# Patient Record
Sex: MALE | Race: WHITE | Employment: OTHER | URBAN - METROPOLITAN AREA
[De-identification: names, ages, dates, MRNs, and addresses within clinical notes are randomized per-mention and may not be internally consistent; named-entity substitution may affect disease eponyms.]

---

## 2017-01-23 ENCOUNTER — LAB ENCOUNTER (OUTPATIENT)
Dept: LAB | Age: 77
End: 2017-01-23
Attending: INTERNAL MEDICINE
Payer: MEDICARE

## 2017-01-23 DIAGNOSIS — E88.81 DYSMETABOLIC SYNDROME X: ICD-10-CM

## 2017-01-23 DIAGNOSIS — E78.00 PURE HYPERCHOLESTEROLEMIA: Primary | ICD-10-CM

## 2017-01-23 DIAGNOSIS — I10 ESSENTIAL HYPERTENSION, MALIGNANT: ICD-10-CM

## 2017-01-23 LAB
ALBUMIN SERPL-MCNC: 3.7 G/DL (ref 3.5–4.8)
ALP LIVER SERPL-CCNC: 67 U/L (ref 45–117)
ALT SERPL-CCNC: 31 U/L (ref 17–63)
AST SERPL-CCNC: 19 U/L (ref 15–41)
BILIRUB SERPL-MCNC: 1.9 MG/DL (ref 0.1–2)
BUN BLD-MCNC: 16 MG/DL (ref 8–20)
CALCIUM BLD-MCNC: 9.6 MG/DL (ref 8.3–10.3)
CHLORIDE: 104 MMOL/L (ref 101–111)
CHOLEST SMN-MCNC: 164 MG/DL (ref ?–200)
CO2: 34 MMOL/L (ref 22–32)
CREAT BLD-MCNC: 0.88 MG/DL (ref 0.7–1.3)
GLUCOSE BLD-MCNC: 111 MG/DL (ref 70–99)
HDLC SERPL-MCNC: 56 MG/DL (ref 45–?)
HDLC SERPL: 2.93 {RATIO} (ref ?–4.97)
LDLC SERPL CALC-MCNC: 68 MG/DL (ref ?–130)
M PROTEIN MFR SERPL ELPH: 6.9 G/DL (ref 6.1–8.3)
NONHDLC SERPL-MCNC: 108 MG/DL (ref ?–130)
POTASSIUM SERPL-SCNC: 4.3 MMOL/L (ref 3.6–5.1)
SODIUM SERPL-SCNC: 140 MMOL/L (ref 136–144)
TRIGLYCERIDES: 198 MG/DL (ref ?–150)
VLDL: 40 MG/DL (ref 5–40)

## 2017-01-23 PROCEDURE — 36415 COLL VENOUS BLD VENIPUNCTURE: CPT

## 2017-01-23 PROCEDURE — 80061 LIPID PANEL: CPT

## 2017-01-23 PROCEDURE — 80053 COMPREHEN METABOLIC PANEL: CPT

## 2017-02-08 ENCOUNTER — TELEPHONE (OUTPATIENT)
Dept: FAMILY MEDICINE CLINIC | Facility: CLINIC | Age: 77
End: 2017-02-08

## 2017-02-10 ENCOUNTER — MED REC SCAN ONLY (OUTPATIENT)
Dept: FAMILY MEDICINE CLINIC | Facility: CLINIC | Age: 77
End: 2017-02-10

## 2017-02-10 ENCOUNTER — OFFICE VISIT (OUTPATIENT)
Dept: FAMILY MEDICINE CLINIC | Facility: CLINIC | Age: 77
End: 2017-02-10

## 2017-02-10 ENCOUNTER — TELEPHONE (OUTPATIENT)
Dept: FAMILY MEDICINE CLINIC | Facility: CLINIC | Age: 77
End: 2017-02-10

## 2017-02-10 VITALS
HEIGHT: 66 IN | DIASTOLIC BLOOD PRESSURE: 76 MMHG | SYSTOLIC BLOOD PRESSURE: 124 MMHG | WEIGHT: 242 LBS | HEART RATE: 80 BPM | TEMPERATURE: 99 F | BODY MASS INDEX: 38.89 KG/M2 | RESPIRATION RATE: 20 BRPM

## 2017-02-10 DIAGNOSIS — M54.32 SCIATICA OF LEFT SIDE: ICD-10-CM

## 2017-02-10 DIAGNOSIS — E04.2 GOITER, NONTOXIC, MULTINODULAR: ICD-10-CM

## 2017-02-10 DIAGNOSIS — E03.9 ACQUIRED HYPOTHYROIDISM: Primary | ICD-10-CM

## 2017-02-10 DIAGNOSIS — E66.01 SEVERE OBESITY (BMI 35.0-39.9): ICD-10-CM

## 2017-02-10 DIAGNOSIS — Z85.46 HISTORY OF PROSTATE CANCER: ICD-10-CM

## 2017-02-10 DIAGNOSIS — Z90.79 STATUS POST PROSTATECTOMY: ICD-10-CM

## 2017-02-10 DIAGNOSIS — M51.26 LUMBAR DISC HERNIATION: ICD-10-CM

## 2017-02-10 DIAGNOSIS — J30.0 VASOMOTOR RHINITIS: ICD-10-CM

## 2017-02-10 DIAGNOSIS — R73.9 BLOOD GLUCOSE ELEVATED: ICD-10-CM

## 2017-02-10 PROBLEM — H40.9 GLAUCOMA: Status: ACTIVE | Noted: 2017-02-10

## 2017-02-10 PROCEDURE — 99204 OFFICE O/P NEW MOD 45 MIN: CPT | Performed by: FAMILY MEDICINE

## 2017-02-10 RX ORDER — TRAMADOL HYDROCHLORIDE 50 MG/1
50 TABLET ORAL EVERY 6 HOURS PRN
Qty: 30 TABLET | Refills: 1 | Status: SHIPPED
Start: 2017-02-10 | End: 2017-02-24

## 2017-02-10 RX ORDER — AZELASTINE 1 MG/ML
2 SPRAY, METERED NASAL 2 TIMES DAILY
Qty: 1 BOTTLE | Refills: 3 | Status: SHIPPED | OUTPATIENT
Start: 2017-02-10 | End: 2018-08-07

## 2017-02-10 RX ORDER — ACETAMINOPHEN AND CODEINE PHOSPHATE 300; 30 MG/1; MG/1
1-2 TABLET ORAL EVERY 4 HOURS PRN
Qty: 90 TABLET | Refills: 0 | Status: SHIPPED
Start: 2017-02-10 | End: 2017-02-20

## 2017-02-10 NOTE — PATIENT INSTRUCTIONS
Dr. Woo Primrose    Pain specialist.    Phone: [phone] [address]  [place], [address]

## 2017-02-10 NOTE — PROGRESS NOTES
Patient presents with:  Establish Care        HPI:      Pt has L buttock pain, sharp, throbbing, radiation to the L foot, worsening, certain positions makes it worse and rest and lying still makes it better. Worsening. Last:years, knows disk disease.     Casilda Koyanagi Unspecified essential hypertension    • Cancer [facility])      Prostate   • SVT (supraventricular tachycardia) ([facility] [address] 2006     Aprox.  2006   • History of cardioversion    • GERD (gastroesophageal reflux disease)    • Hypothyroidism    • Sleep apnea      c pap cancer  Plan: will get MR.    (J30.0) Vasomotor rhinitis  Plan: Azelastine HCl (ASTELIN) 0.1 % Nasal Solution    Obesity severe, elevated BG: diet d/w the pt, 45 minutes education about weight loss, healthy life styles, thyroid nodules.           F/u in 3 m

## 2017-02-17 ENCOUNTER — HOSPITAL ENCOUNTER (OUTPATIENT)
Dept: MRI IMAGING | Age: 77
Discharge: HOME | End: 2017-02-17
Attending: FAMILY MEDICINE
Payer: MEDICARE

## 2017-02-17 DIAGNOSIS — M51.26 LUMBAR DISC HERNIATION: ICD-10-CM

## 2017-02-17 PROCEDURE — 72148 MRI LUMBAR SPINE W/O DYE: CPT

## 2017-02-21 ENCOUNTER — TELEPHONE (OUTPATIENT)
Dept: FAMILY MEDICINE CLINIC | Facility: CLINIC | Age: 77
End: 2017-02-21

## 2017-02-21 DIAGNOSIS — M99.53 INTERVERTEBRAL DISC STENOSIS OF NEURAL CANAL OF LUMBAR REGION: Primary | ICD-10-CM

## 2017-02-21 NOTE — TELEPHONE ENCOUNTER
----- Message from [place], [place] sent at 2/18/2017 12:06 PM CST -----  Patient has severe canal stenosis from L2-L5 from disc bulge. Pt needs to see Dr. Frandy Roman group for further eval and tx.

## 2017-02-21 NOTE — TELEPHONE ENCOUNTER
Ester Mcnamara:  [address] [facility], [address]   Phone: [phone]   Fax: [phone]    Patient has an appointment to see Dr. Isaac Moody in March. Will make appointment to see Dr. Mishel Thompson also.     Spoke to patient and sent information via my marcus

## 2017-03-03 ENCOUNTER — TELEPHONE (OUTPATIENT)
Dept: FAMILY MEDICINE CLINIC | Facility: CLINIC | Age: 77
End: 2017-03-03

## 2017-03-03 DIAGNOSIS — Z01.818 PRE-OP TESTING: Primary | ICD-10-CM

## 2017-03-03 NOTE — TELEPHONE ENCOUNTER
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 Quiana Montgomery and  Pre-op with Dr. Rudolph Barraza and need to have blood work/chext xray/mrsa culture/ u/a c/s  1 week prior to appt. The EKG day of pre-op.

## 2017-03-14 ENCOUNTER — TELEPHONE (OUTPATIENT)
Dept: FAMILY MEDICINE CLINIC | Facility: CLINIC | Age: 77
End: 2017-03-14

## 2017-08-12 ENCOUNTER — TELEPHONE (OUTPATIENT)
Dept: FAMILY MEDICINE CLINIC | Facility: CLINIC | Age: 77
End: 2017-08-12

## 2017-08-12 RX ORDER — ASPIRIN 81 MG/1
81 TABLET ORAL DAILY
Qty: 30 TABLET | Refills: 11 | Status: SHIPPED | OUTPATIENT
End: 2017-09-19 | Stop reason: ALTCHOICE

## 2017-09-19 ENCOUNTER — OFFICE VISIT (OUTPATIENT)
Dept: FAMILY MEDICINE CLINIC | Facility: CLINIC | Age: 77
End: 2017-09-19

## 2017-09-19 VITALS
SYSTOLIC BLOOD PRESSURE: 116 MMHG | DIASTOLIC BLOOD PRESSURE: 74 MMHG | OXYGEN SATURATION: 98 % | HEART RATE: 62 BPM | BODY MASS INDEX: 34 KG/M2 | TEMPERATURE: 98 F | WEIGHT: 224 LBS | RESPIRATION RATE: 16 BRPM

## 2017-09-19 DIAGNOSIS — J01.40 ACUTE PANSINUSITIS, RECURRENCE NOT SPECIFIED: Primary | ICD-10-CM

## 2017-09-19 PROCEDURE — 99213 OFFICE O/P EST LOW 20 MIN: CPT | Performed by: PHYSICIAN ASSISTANT

## 2017-09-19 RX ORDER — AMOXICILLIN AND CLAVULANATE POTASSIUM 875; 125 MG/1; MG/1
1 TABLET, FILM COATED ORAL 2 TIMES DAILY
Qty: 20 TABLET | Refills: 0 | Status: SHIPPED | OUTPATIENT
Start: 2017-09-19 | End: 2017-09-29

## 2017-09-19 NOTE — PATIENT INSTRUCTIONS
Self-Care for Sinusitis     Drinking plenty of water can help sinuses drain. Sinusitis can often be managed with self-care. Self-care can keep sinuses moist and make you feel more comfortable. Remember to follow your doctor's instructions closely.  This Colds, flu, and allergies make it more likely for you to get sinusitis. Do your best to prevent sinusitis by preventing these problems. Do what you can to avoid getting colds and other infections. Stay away from things that cause allergies (allergens).  Isabel Singleton · Stay away from all types of smoke, which dries out sinus linings. This includes tobacco smoke and chemical smoke in workplace settings. · Use saltwater rinses.   Date Last Reviewed: 10/1/2016  © 8625-5454 The [address] Treatment is aimed at unblocking the sinus opening and helping the cilia work again. You may need to take antihistamine and decongestant medicine. These can reduce inflammation and decrease the amount of fluid your sinuses make.  If you have a bacterial inf

## 2017-09-19 NOTE — PROGRESS NOTES
CHIEF COMPLAINT:   Patient presents with:  Nasal Congestion: almost 1 week. HPI:   Fito Garg is a 78year old male who presents for sinus congestion for  1  weeks. Symptoms have been worsening since onset.  Sinus congestion/pain is described as a pres • SVT (supraventricular tachycardia) ([facility] [address] 2006    Aprox.  2006   • Thyroid disease    • Unspecified essential hypertension       Past Surgical History:  2016: COLONOSCPY, FLEXIBLE, PROXIMAL TO SPLENIC FLEX*  02/2002: LAPAROSCOPY, SURGICAL PROSTATECTOMY, RE ASSESSMENT: Acute pansinusitis, recurrence not specified  (primary encounter diagnosis)    PLAN: Meds as below- take with meals, probiotic and/or greek yogurt as directed. Pt has taken before and tolerated well.   Comfort care instructions as listed in Ismael Resendiz Your doctor may prescribe medications to help treat your sinusitis. If you have an infection, antibiotics can help clear it up. If you are prescribed antibiotics, take all pills on schedule until they are gone, even if you feel better.  Decongestants help r · Sit in the nonsmoking sections of restaurants. · Don't go outdoors during peak pollution hours such as rush hour. · Keep an air conditioner on during allergy season. Clean its filter regularly.   · Ask your healthcare provider about a referral to have a Common symptoms of acute sinusitis  You may have:  · Facial soreness pain  · Headache  · Fever  · Fluid draining in the back of the throat (postnasal drip)  · Congestion  · Drainage that is thick and colored, instead of clear  · Cough  Diagnosing acute sin

## 2017-10-04 ENCOUNTER — HOSPITAL ENCOUNTER (OUTPATIENT)
Dept: CV DIAGNOSTICS | Facility: HOSPITAL | Age: 77
Discharge: HOME | End: 2017-10-04
Attending: INTERNAL MEDICINE
Payer: MEDICARE

## 2017-10-04 ENCOUNTER — HOSPITAL ENCOUNTER (OUTPATIENT)
Dept: CARDIOLOGY CLINIC | Facility: HOSPITAL | Age: 77
Discharge: HOME | End: 2017-10-04
Attending: INTERNAL MEDICINE

## 2017-10-04 DIAGNOSIS — R94.31 ABNORMAL EKG: ICD-10-CM

## 2017-10-04 DIAGNOSIS — I65.23 BILATERAL CAROTID ARTERY STENOSIS: ICD-10-CM

## 2017-10-04 PROCEDURE — 93017 CV STRESS TEST TRACING ONLY: CPT | Performed by: INTERNAL MEDICINE

## 2017-10-04 PROCEDURE — 78452 HT MUSCLE IMAGE SPECT MULT: CPT | Performed by: INTERNAL MEDICINE

## 2017-10-04 PROCEDURE — 93018 CV STRESS TEST I&R ONLY: CPT | Performed by: INTERNAL MEDICINE

## 2017-10-06 ENCOUNTER — TELEPHONE (OUTPATIENT)
Dept: FAMILY MEDICINE CLINIC | Facility: CLINIC | Age: 77
End: 2017-10-06

## 2017-12-05 ENCOUNTER — TELEPHONE (OUTPATIENT)
Dept: FAMILY MEDICINE CLINIC | Facility: CLINIC | Age: 77
End: 2017-12-05

## 2017-12-11 ENCOUNTER — OFFICE VISIT (OUTPATIENT)
Dept: FAMILY MEDICINE CLINIC | Facility: CLINIC | Age: 77
End: 2017-12-11

## 2017-12-11 ENCOUNTER — APPOINTMENT (OUTPATIENT)
Dept: LAB | Age: 77
End: 2017-12-11
Attending: FAMILY MEDICINE
Payer: MEDICARE

## 2017-12-11 ENCOUNTER — MED REC SCAN ONLY (OUTPATIENT)
Dept: FAMILY MEDICINE CLINIC | Facility: CLINIC | Age: 77
End: 2017-12-11

## 2017-12-11 VITALS
SYSTOLIC BLOOD PRESSURE: 128 MMHG | DIASTOLIC BLOOD PRESSURE: 72 MMHG | RESPIRATION RATE: 22 BRPM | WEIGHT: 221 LBS | HEART RATE: 78 BPM | HEIGHT: 68 IN | OXYGEN SATURATION: 99 % | BODY MASS INDEX: 33.49 KG/M2 | TEMPERATURE: 99 F

## 2017-12-11 DIAGNOSIS — E78.00 PURE HYPERCHOLESTEROLEMIA: ICD-10-CM

## 2017-12-11 DIAGNOSIS — E03.9 ACQUIRED HYPOTHYROIDISM: Primary | ICD-10-CM

## 2017-12-11 DIAGNOSIS — Z23 NEED FOR PNEUMOCOCCAL VACCINATION: ICD-10-CM

## 2017-12-11 DIAGNOSIS — I10 HTN (HYPERTENSION), BENIGN: ICD-10-CM

## 2017-12-11 DIAGNOSIS — Z85.46 HISTORY OF PROSTATE CANCER: ICD-10-CM

## 2017-12-11 PROBLEM — E66.01 SEVERE OBESITY (BMI 35.0-39.9): Status: RESOLVED | Noted: 2017-02-10 | Resolved: 2017-12-11

## 2017-12-11 PROCEDURE — 99214 OFFICE O/P EST MOD 30 MIN: CPT | Performed by: FAMILY MEDICINE

## 2017-12-11 PROCEDURE — 85025 COMPLETE CBC W/AUTO DIFF WBC: CPT | Performed by: FAMILY MEDICINE

## 2017-12-11 PROCEDURE — 90670 PCV13 VACCINE IM: CPT | Performed by: FAMILY MEDICINE

## 2017-12-11 PROCEDURE — 80061 LIPID PANEL: CPT | Performed by: FAMILY MEDICINE

## 2017-12-11 PROCEDURE — 84153 ASSAY OF PSA TOTAL: CPT | Performed by: FAMILY MEDICINE

## 2017-12-11 PROCEDURE — 84443 ASSAY THYROID STIM HORMONE: CPT | Performed by: FAMILY MEDICINE

## 2017-12-11 PROCEDURE — 36415 COLL VENOUS BLD VENIPUNCTURE: CPT | Performed by: FAMILY MEDICINE

## 2017-12-11 PROCEDURE — G0009 ADMIN PNEUMOCOCCAL VACCINE: HCPCS | Performed by: FAMILY MEDICINE

## 2017-12-11 PROCEDURE — 80053 COMPREHEN METABOLIC PANEL: CPT | Performed by: FAMILY MEDICINE

## 2017-12-11 RX ORDER — LEVOTHYROXINE SODIUM 0.05 MG/1
TABLET ORAL
Qty: 90 TABLET | Refills: 4 | Status: SHIPPED | OUTPATIENT
Start: 2017-12-11 | End: 2018-03-26

## 2017-12-11 NOTE — PROGRESS NOTES
CC: HL,HTN, hypothyroid, thyroid nodules. Jesus Medellin is a 78year old male who presents for recheck of hyperlipidemia. Patient reports taking medications as instructed, no medication side effects noted. Denies any generalized muscle aches.   Patient 02/21/2011 22   ----------  ALT (SGPT) (IU/L)   Date Value   12/21/2006 35   03/13/2006 26   ----------  ALT (U/L)   Date Value   09/11/2013 54   02/24/2012 33   02/21/2011 32   ----------  Alt (U/L)   Date Value   01/23/2017 31   05/26/2016 28   02/21/2 Packs/day: 0.00      Years: 0.00         Types: Cigars  Smokeless tobacco: Never Used                      Alcohol use: Yes           0.0 oz/week     Comment: social          REVIEW OF SYSTEMS:   GENERAL HEALTH: feels well otherwise  SKIN: denies any unusu

## 2017-12-11 NOTE — PATIENT INSTRUCTIONS
Dr. Reina Heath, DO    Neurology  [facility] [place]  [address] [facility]    Phone: [phone] Dr Herlinda Roger, [address] [place] [address]    Phone: [phone]

## 2018-02-05 ENCOUNTER — OFFICE VISIT (OUTPATIENT)
Dept: NEUROLOGY | Facility: CLINIC | Age: 78
End: 2018-02-05

## 2018-02-05 VITALS
SYSTOLIC BLOOD PRESSURE: 126 MMHG | WEIGHT: 222 LBS | BODY MASS INDEX: 33.65 KG/M2 | RESPIRATION RATE: 16 BRPM | HEART RATE: 62 BPM | DIASTOLIC BLOOD PRESSURE: 80 MMHG | HEIGHT: 68 IN

## 2018-02-05 DIAGNOSIS — R26.9 GAIT DISORDER: ICD-10-CM

## 2018-02-05 DIAGNOSIS — M48.061 SPINAL STENOSIS AT L4-L5 LEVEL: Primary | ICD-10-CM

## 2018-02-05 PROCEDURE — 99204 OFFICE O/P NEW MOD 45 MIN: CPT | Performed by: OTHER

## 2018-02-05 RX ORDER — TIMOLOL MALEATE 5 MG/ML
SOLUTION/ DROPS OPHTHALMIC
Refills: 3 | COMMUNITY
Start: 2018-01-12 | End: 2018-10-18

## 2018-02-05 RX ORDER — GABAPENTIN 100 MG/1
CAPSULE ORAL
Qty: 270 CAPSULE | Refills: 1 | Status: SHIPPED | OUTPATIENT
Start: 2018-02-05 | End: 2018-09-21 | Stop reason: ALTCHOICE

## 2018-02-05 NOTE — PATIENT INSTRUCTIONS
Refill policies:    • Allow 2-3 business days for refills; controlled substances may take longer.   • Contact your pharmacy at least 5 days prior to running out of medication and have them send an electronic request or submit request through the [facility] recommended that you have a procedure or additional testing performed. [facility]) will contact your insurance carrier to obtain pre-certification or prior authorization.     Unfortunately, [facility] has seen an increase in denial of paym

## 2018-02-05 NOTE — PROGRESS NOTES
[address]   Neurology- INITIAL CLINIC VISIT  2018, 11:42 AM     Louis Benson Patient Status:  No patient class for patient encounter    1940 MRN [id number]   Location [address] [place] [address] that he has quit smoking. His smoking use included Cigars. He has never used smokeless tobacco. He reports that he drinks alcohol. He reports that he does not use drugs.     Allergies:  No Known Allergies    MEDICATIONS:    Current Outpatient Prescriptions: distress  Cardiac: Normal rate & regular rhythm  Lungs: Clear to auscultation bilaterally  Skin: There are no rashes or other skin lesions.   Musculoskeletal: There is no scoliosis, or joint deformities  Neurologic examination:  Mental status: Patient is al to spinal stenosis, EMG to rule out neuropathy if does not improve with PT    1. Spinal stenosis at L4-L5 level  As above  - OP REFERRAL TO [facility]    2.  Gait disorder  As above  - OP REFERRAL TO [facility]

## 2018-02-20 ENCOUNTER — OFFICE VISIT (OUTPATIENT)
Dept: PHYSICAL THERAPY | Age: 78
End: 2018-02-20
Attending: Other
Payer: MEDICARE

## 2018-02-20 DIAGNOSIS — R26.9 GAIT DISORDER: ICD-10-CM

## 2018-02-20 DIAGNOSIS — M48.061 SPINAL STENOSIS AT L4-L5 LEVEL: ICD-10-CM

## 2018-02-20 PROCEDURE — 97110 THERAPEUTIC EXERCISES: CPT

## 2018-02-20 PROCEDURE — 97162 PT EVAL MOD COMPLEX 30 MIN: CPT

## 2018-02-20 NOTE — PROGRESS NOTES
NEUROLOGICAL EVALUATION:   Referring Physician: Dr. Aisha Jon  Diagnosis: Spinal stenosis at L4-L5 level,  Gait disorder    Date of Service: 2/20/2018     PATIENT SUMMARY   Katherin Go is a 78year old y/o male who presents to therapy today with complai difficulty with turning head when walking, difficulty with balance in the dark. Yuni Hackett describes prior level of function as independent in household and community level activity with gradually declining balance.  Pt goals include to improve LE strength, imp Precautions:  Fall Risk, history of prostate cancer  OBJECTIVE:   Observation/Posture: Patient sits and stands with min rounded shoulders and forward head, mod flexible to VC.  Patient is pleasant and moderately hard of hearing, very motivated to improv Issued and Handouts Given including modified tandem stance, DLS EC, STS, DLS feet togethet HT R/L and up/down.     Charges: PT Eval Moderate Complexity, Therex x 1      Total Timed Treatment: 15 min     Total Treatment Time: 50 min     PLAN OF CARE:    Goal [phone]    Sincerely,  Electronically signed by therapist: Kaden Rodriguez, PT, DPT    [de-identified] certification required: Yes  I certify the need for these services furnished under this plan of treatment and while under my care.     X____________________

## 2018-02-22 ENCOUNTER — OFFICE VISIT (OUTPATIENT)
Dept: PHYSICAL THERAPY | Age: 78
End: 2018-02-22
Attending: Other
Payer: MEDICARE

## 2018-02-22 PROCEDURE — 97110 THERAPEUTIC EXERCISES: CPT

## 2018-02-22 PROCEDURE — 97112 NEUROMUSCULAR REEDUCATION: CPT

## 2018-02-22 NOTE — PROGRESS NOTES
Dx: Spinal stenosis at L4-L5 level, Gait disorder            Authorized # of Visits:  10 requested [facility])         Next MD visit: none scheduled  Fall Risk: moderate-high         Precautions: Fall Risk, history of prostate cancer             Subjective: Leonor patient report pain/functional mobility.    Date: 2/22/2018 TX#: 2/10 Date:               TX#: 3/   Date:               TX#: 4/ Date:               TX#: 5/ Date:               TX#: 6/ Date:               TX#: 7/ Date:               TX#: 8/   Maddison L7 x 5 m

## 2018-02-27 ENCOUNTER — OFFICE VISIT (OUTPATIENT)
Dept: PHYSICAL THERAPY | Age: 78
End: 2018-02-27
Attending: Other
Payer: MEDICARE

## 2018-02-27 PROCEDURE — 97112 NEUROMUSCULAR REEDUCATION: CPT

## 2018-02-27 PROCEDURE — 97110 THERAPEUTIC EXERCISES: CPT

## 2018-02-27 NOTE — PROGRESS NOTES
Dx: Spinal stenosis at L4-L5 level, Gait disorder            Authorized # of Visits:  10 requested [facility])         Next MD visit: none scheduled  Fall Risk: moderate-high         Precautions: Fall Risk, history of prostate cancer             Subjective: Leonor and navigate uneven terrain for travelling. 5. Patient will be independent and compliant in HEP to maintain progress made in PT. Plan: Continue balance and strength training with progression as tolerated and indicated.  Assess lumbar further as needed

## 2018-03-01 ENCOUNTER — OFFICE VISIT (OUTPATIENT)
Dept: PHYSICAL THERAPY | Age: 78
End: 2018-03-01
Attending: Other
Payer: MEDICARE

## 2018-03-01 PROCEDURE — 97530 THERAPEUTIC ACTIVITIES: CPT

## 2018-03-01 PROCEDURE — 97110 THERAPEUTIC EXERCISES: CPT

## 2018-03-01 PROCEDURE — 97112 NEUROMUSCULAR REEDUCATION: CPT

## 2018-03-01 NOTE — PROGRESS NOTES
Dx: Spinal stenosis at L4-L5 level, Gait disorder            Authorized # of Visits:  10 requested [facility])         Next MD visit: none scheduled  Fall Risk: moderate-high         Precautions: Fall Risk, history of prostate cancer             Subjective: Leonor reciprocally and navigate uneven terrain for travelling. - progress  5.  Patient will be independent and compliant in HEP to maintain progress made in PT. - issued and progressed    Plan: Continue balance and strength training with progression as tolerated Walking cueing big steps, big arms x 5 min Cueing as needed with dynamic gait       Skilled Services: NR balance and compliant surface training, EC to improve somatosensory and proprioception LEs. LE therex progression within tolerance.  Progressed theract

## 2018-03-05 ENCOUNTER — OFFICE VISIT (OUTPATIENT)
Dept: NEUROLOGY | Facility: CLINIC | Age: 78
End: 2018-03-05

## 2018-03-05 VITALS
BODY MASS INDEX: 33.95 KG/M2 | DIASTOLIC BLOOD PRESSURE: 72 MMHG | SYSTOLIC BLOOD PRESSURE: 126 MMHG | HEART RATE: 66 BPM | HEIGHT: 68 IN | WEIGHT: 224 LBS | RESPIRATION RATE: 16 BRPM

## 2018-03-05 DIAGNOSIS — M48.061 SPINAL STENOSIS AT L4-L5 LEVEL: Primary | ICD-10-CM

## 2018-03-05 DIAGNOSIS — R26.9 GAIT DISORDER: ICD-10-CM

## 2018-03-05 PROCEDURE — 99213 OFFICE O/P EST LOW 20 MIN: CPT | Performed by: OTHER

## 2018-03-05 RX ORDER — GABAPENTIN 300 MG/1
300 CAPSULE ORAL 3 TIMES DAILY
Qty: 90 CAPSULE | Refills: 5 | Status: SHIPPED | OUTPATIENT
Start: 2018-03-05 | End: 2018-09-21 | Stop reason: ALTCHOICE

## 2018-03-05 RX ORDER — GABAPENTIN 100 MG/1
300 CAPSULE ORAL 3 TIMES DAILY
Qty: 270 CAPSULE | Refills: 1 | Status: CANCELLED | OUTPATIENT
Start: 2018-03-05

## 2018-03-05 RX ORDER — BRIMONIDINE TARTRATE 2 MG/ML
SOLUTION/ DROPS OPHTHALMIC
Refills: 3 | COMMUNITY
Start: 2018-02-23 | End: 2019-12-26 | Stop reason: ALTCHOICE

## 2018-03-05 NOTE — PATIENT INSTRUCTIONS
Refill policies:    • Allow 2-3 business days for refills; controlled substances may take longer.   • Contact your pharmacy at least 5 days prior to running out of medication and have them send an electronic request or submit request through the [facility] recommended that you have a procedure or additional testing performed. [facility]) will contact your insurance carrier to obtain pre-certification or prior authorization.     Unfortunately, [facility] has seen an increase in denial of paym

## 2018-03-05 NOTE — PROGRESS NOTES
[address] [address]   Neurology- follow up    Louis Benson Patient Status:  No patient class for patient encounter    1940 MRN [id number]   Location [facility] Fiordaliza Real MD       Reas History:  family history is not on file. Social History:   reports that he has quit smoking. His smoking use included Cigars. He has never used smokeless tobacco. He reports that he drinks alcohol. He reports that he does not use drugs.     Allergies:  [age] year old male in no acute distress  Cardiac: Normal rate & regular rhythm  Lungs: Clear to auscultation bilaterally  Skin: There are no rashes or other skin lesions.   Musculoskeletal: There is no scoliosis, or joint deformities  Neurologic examination:  Me All questions and concerns were addressed.      Nuha Mayberry DO  Neurology and Neuromuscular medicine  [facility]

## 2018-03-06 ENCOUNTER — OFFICE VISIT (OUTPATIENT)
Dept: PHYSICAL THERAPY | Age: 78
End: 2018-03-06
Attending: Other
Payer: MEDICARE

## 2018-03-06 PROCEDURE — 97530 THERAPEUTIC ACTIVITIES: CPT

## 2018-03-06 PROCEDURE — 97110 THERAPEUTIC EXERCISES: CPT

## 2018-03-06 PROCEDURE — 97112 NEUROMUSCULAR REEDUCATION: CPT

## 2018-03-06 NOTE — PROGRESS NOTES
Dx: Spinal stenosis at L4-L5 level, Gait disorder            Authorized # of Visits:  10 requested [facility])         Next MD visit: none scheduled  Fall Risk: moderate-high         Precautions: Fall Risk, history of prostate cancer             Subjective: Leonor ADL such as community ambulation. - progress  3. Patient will perform 4-Item DGI with score of 10/12 or greater with least restrictive AD to demonstrate ability to ambulate safely in crowded and busy environments. - progress  4.  Patient will improve functi 2  -HT combo x 2  -Object  x 2  (cueing big steps as needed)      EC on airex  - DLS, 3 x 21V  - DLS 2# OH press x 10  - DLS 2# twists x 10 EC  - fwd walking in // bars x 2 laps  - fwd walking out of // bars, check for veering, 25ft x 8 EC  - feet t

## 2018-03-08 ENCOUNTER — OFFICE VISIT (OUTPATIENT)
Dept: PHYSICAL THERAPY | Age: 78
End: 2018-03-08
Attending: Other
Payer: MEDICARE

## 2018-03-08 PROCEDURE — 97110 THERAPEUTIC EXERCISES: CPT

## 2018-03-08 PROCEDURE — 97112 NEUROMUSCULAR REEDUCATION: CPT

## 2018-03-08 RX ORDER — OMEPRAZOLE 40 MG/1
CAPSULE, DELAYED RELEASE ORAL
Qty: 180 CAPSULE | Refills: 3 | Status: SHIPPED | OUTPATIENT
Start: 2018-03-08 | End: 2020-02-17

## 2018-03-08 NOTE — PROGRESS NOTES
Dx: Spinal stenosis at L4-L5 level, Gait disorder            Authorized # of Visits:  10 requested [facility])         Next MD visit: none scheduled  Fall Risk: moderate-high         Precautions: Fall Risk, history of prostate cancer             Subjective: Cont ascend/descend stairs reciprocally and navigate uneven terrain for travelling. - progress  5.  Patient will be independent and compliant in HEP to maintain progress made in PT. - issued and progressed    Plan: Patient to hold x 2 weeks for vacation, will re fwd walking in // bars x 2 laps  - fwd walking out of // bars, check for veering, 25ft x 8 EC  - feet together multi-direct perturbations 2 x 30s EC   As listed below with airex -     Tandem walking in // bars x 4 laps, VC for foot placement  Full tandem s

## 2018-03-08 NOTE — TELEPHONE ENCOUNTER
LOV:12/11/17  Rx has not been filled by provider.      Please approve or deny pending Rx request.   Thank you

## 2018-03-08 NOTE — TELEPHONE ENCOUNTER
Pt called to request refill of Omeprazole 40mg, please send to [address], [address] [place] [address], [phone], [phone]. If any questions, call on Mobile number.

## 2018-03-26 DIAGNOSIS — E03.9 ACQUIRED HYPOTHYROIDISM: ICD-10-CM

## 2018-03-26 RX ORDER — LEVOTHYROXINE SODIUM 0.05 MG/1
TABLET ORAL
Qty: 90 TABLET | Refills: 4 | Status: SHIPPED | OUTPATIENT
Start: 2018-03-26 | End: 2019-06-24

## 2018-03-26 NOTE — TELEPHONE ENCOUNTER
Demetrius with [employer] called requesting refill for pt on Levothyroxine Sodium 50 MCG Oral Tab

## 2018-03-27 ENCOUNTER — OFFICE VISIT (OUTPATIENT)
Dept: PHYSICAL THERAPY | Age: 78
End: 2018-03-27
Attending: Other
Payer: MEDICARE

## 2018-03-27 PROCEDURE — 97110 THERAPEUTIC EXERCISES: CPT

## 2018-03-27 PROCEDURE — 97530 THERAPEUTIC ACTIVITIES: CPT

## 2018-03-27 PROCEDURE — 97112 NEUROMUSCULAR REEDUCATION: CPT

## 2018-03-27 NOTE — PROGRESS NOTES
Dx: Spinal stenosis at L4-L5 level, Gait disorder            Authorized # of Visits:  10 requested [facility])         Next MD visit: none scheduled  Fall Risk: moderate-high         Precautions: Fall Risk, history of prostate cancer             Subjective: Leonor and decrease risk of falls on uneven surfaces such as grass. - progress  2. Patient will perform TUG in <10 seconds with least restrictive AD, demonstrating improved gait speed for improved participation in ADL such as community ambulation. - progress  3. 3 attempts R/L Shuttle  - DLS, 4B x 20  - SLS, 3B 2 x 10 R/L    Multi-direct catch on airex x 3 min Bosu DLS SBA-CGA 3 x 13C Bosu DLS SBA-CGA 3 x 36J - 6MWT 6\" step ups with cues for glute set x 15 R/L UEs as needed    YTB  - side step x 3 laps  - fwd lianet 2 laps - GTB side step x 3 laps // bars      Skilled Services: NR balance and compliant surface training, small ELIE training. LE therex progression within tolerance. Progressed theract dynamic gait with increased difficulty of dual task.  Theract stair gita

## 2018-03-29 ENCOUNTER — OFFICE VISIT (OUTPATIENT)
Dept: PHYSICAL THERAPY | Age: 78
End: 2018-03-29
Attending: Other
Payer: MEDICARE

## 2018-03-29 PROCEDURE — 97110 THERAPEUTIC EXERCISES: CPT

## 2018-03-29 PROCEDURE — 97530 THERAPEUTIC ACTIVITIES: CPT

## 2018-03-29 NOTE — PROGRESS NOTES
Discharge Summary  Dx:  Spinal stenosis at L4-L5 level, Gait disorder            Authorized # of Visits:  10 requested [facility])         Next MD visit: none scheduled  Fall Risk: moderate-high         Precautions: Fall Risk, history of prostate cancer Strength WNL                  A/PROM Strength (-/5)     R L R L   Hip             Flexion [facility] WFL 5 4+     Extension [facility] WFL 4+ 4+     Abduction [facility] WFL 4 4     External Rot WFL WFL       Knee             Flexion [facility] WFL 5 5     Extension [facility] WFL 5 5   Jessi FOTO: 69/100     Plan: D/C from PT with continued independent HEP due to goals generally met and patient ready/willing to continue with HEP as listed.         Patient/Family/Caregiver was advised of these findings, precautions, and treatment options and h 6MWT 6\" step ups with cues for glute set x 15 R/L UEs as needed 6\" step ups with cues for glute set x 15 R/L UEs as needed   YTB  - side step x 3 laps  - fwd step x 3 laps - Dynamic Gait, 100ft   - fwd catch x 4  - HT R/L x 4  - HT up/down x 2  - HT comb now x 10 GTB, 25ft  - side step with mini squat x 1 lap  - fwd x 2 laps  - retro x 2 laps - GTB side step x 3 laps // bars   - GTB side step x 3 laps // bars   Skilled Services: Assessed goals as needed, provided education on POC, HEP progression, and D/C

## 2018-05-17 ENCOUNTER — TELEPHONE (OUTPATIENT)
Dept: FAMILY MEDICINE CLINIC | Facility: CLINIC | Age: 78
End: 2018-05-17

## 2018-08-07 ENCOUNTER — TELEPHONE (OUTPATIENT)
Dept: FAMILY MEDICINE CLINIC | Facility: CLINIC | Age: 78
End: 2018-08-07

## 2018-08-07 NOTE — TELEPHONE ENCOUNTER
Patient needs a referral to a glaucoma specialist.  Please advise patient's wife Kanika Hernandez with this information.

## 2018-08-07 NOTE — TELEPHONE ENCOUNTER
Wife notified of below orders. Dr. Alaina Maldonado office info given. All questions answered, wife expresses understanding.

## 2018-08-07 NOTE — TELEPHONE ENCOUNTER
I am happy to give him whoever his eye doctor recommend, we can ask Dr. Vinita Lester who he recommends if pt has no specialist.  Also no steroids nasal sprays OTC, please update the chart.

## 2018-08-07 NOTE — TELEPHONE ENCOUNTER
Pt sees Dr. Ganesh Renee at [facility] for Primary open angle glaucoma of both eyes, severe stage.   Pt is requesting a referral to another Glaucoma Specialist.

## 2018-08-30 ENCOUNTER — TELEPHONE (OUTPATIENT)
Dept: FAMILY MEDICINE CLINIC | Facility: CLINIC | Age: 78
End: 2018-08-30

## 2018-08-30 NOTE — TELEPHONE ENCOUNTER
CVM was left on Hippa line Re: Future Eye surgery 9/27/18 with Dr. Maria Guadalupe Shane needs to make an appointment for Pre op with EKG.

## 2018-09-04 NOTE — TELEPHONE ENCOUNTER
Patients spouse called back and schedule pre-op appointment in 9/21/18. Patient having eye surgery on 9/27/18 with Dr Tato De La Cruz in [facility]. [place] sheet was filled out and put in doctors philomenia folder.

## 2018-09-21 ENCOUNTER — OFFICE VISIT (OUTPATIENT)
Dept: FAMILY MEDICINE CLINIC | Facility: CLINIC | Age: 78
End: 2018-09-21
Payer: MEDICARE

## 2018-09-21 ENCOUNTER — TELEPHONE (OUTPATIENT)
Dept: FAMILY MEDICINE CLINIC | Facility: CLINIC | Age: 78
End: 2018-09-21

## 2018-09-21 VITALS
TEMPERATURE: 99 F | BODY MASS INDEX: 35.16 KG/M2 | DIASTOLIC BLOOD PRESSURE: 86 MMHG | OXYGEN SATURATION: 96 % | HEIGHT: 68 IN | HEART RATE: 62 BPM | SYSTOLIC BLOOD PRESSURE: 138 MMHG | RESPIRATION RATE: 16 BRPM | WEIGHT: 232 LBS

## 2018-09-21 DIAGNOSIS — H40.1122 PRIMARY OPEN ANGLE GLAUCOMA (POAG) OF LEFT EYE, MODERATE STAGE: ICD-10-CM

## 2018-09-21 DIAGNOSIS — I10 HTN (HYPERTENSION), BENIGN: ICD-10-CM

## 2018-09-21 DIAGNOSIS — Z01.818 PREOPERATIVE EXAMINATION: Primary | ICD-10-CM

## 2018-09-21 PROCEDURE — 93000 ELECTROCARDIOGRAM COMPLETE: CPT | Performed by: FAMILY MEDICINE

## 2018-09-21 PROCEDURE — 99214 OFFICE O/P EST MOD 30 MIN: CPT | Performed by: FAMILY MEDICINE

## 2018-09-21 NOTE — PROGRESS NOTES
CC: Preop exam.      HPI:  Louis Mirza is a 70year old male who presents for a pre-operative physical exam  By  request. Patient is to have glaucoma surgery ,secondary to progression of the cataract and difficulty with the vision  to be on 9 apnea      Comment:  c pap   2006: SVT (supraventricular tachycardia) (HCC)      Comment:  Aprox.  2006  No date: Thyroid disease  No date: Unspecified essential hypertension   Past Surgical History:  2016: COLONOSCPY, FLEXIBLE, PROXIMAL TO SPLENIC FLEXURE; edema  NEURO: Oriented times three,cranial nerves are intact,motor and sensory are grossly intact    ASSESSMENT AND PLAN:   Aaron Honeycutt is a 70year old male who presents for a pre-operative physical exam for glaucoma of the L eye. HTN: under good control.

## 2018-09-21 NOTE — TELEPHONE ENCOUNTER
H&P and EKG [facility] Faxed to [phone]. Fax confirmation received.

## 2018-10-18 ENCOUNTER — APPOINTMENT (OUTPATIENT)
Dept: LAB | Age: 78
End: 2018-10-18
Attending: FAMILY MEDICINE
Payer: MEDICARE

## 2018-10-18 ENCOUNTER — OFFICE VISIT (OUTPATIENT)
Dept: FAMILY MEDICINE CLINIC | Facility: CLINIC | Age: 78
End: 2018-10-18
Payer: MEDICARE

## 2018-10-18 VITALS
BODY MASS INDEX: 34.86 KG/M2 | HEART RATE: 56 BPM | RESPIRATION RATE: 18 BRPM | SYSTOLIC BLOOD PRESSURE: 146 MMHG | DIASTOLIC BLOOD PRESSURE: 86 MMHG | TEMPERATURE: 98 F | WEIGHT: 230 LBS | OXYGEN SATURATION: 97 % | HEIGHT: 68 IN

## 2018-10-18 DIAGNOSIS — Z85.46 HISTORY OF PROSTATE CANCER: ICD-10-CM

## 2018-10-18 DIAGNOSIS — I10 HTN (HYPERTENSION), BENIGN: ICD-10-CM

## 2018-10-18 DIAGNOSIS — E04.2 GOITER, NONTOXIC, MULTINODULAR: ICD-10-CM

## 2018-10-18 DIAGNOSIS — Z00.00 MEDICARE ANNUAL WELLNESS VISIT, SUBSEQUENT: Primary | ICD-10-CM

## 2018-10-18 DIAGNOSIS — R35.0 INCREASED URINARY FREQUENCY: ICD-10-CM

## 2018-10-18 PROBLEM — M54.32 SCIATICA OF LEFT SIDE: Status: RESOLVED | Noted: 2017-02-10 | Resolved: 2018-10-18

## 2018-10-18 PROBLEM — R73.9 BLOOD GLUCOSE ELEVATED: Status: RESOLVED | Noted: 2017-02-10 | Resolved: 2018-10-18

## 2018-10-18 PROCEDURE — G0444 DEPRESSION SCREEN ANNUAL: HCPCS | Performed by: FAMILY MEDICINE

## 2018-10-18 PROCEDURE — G0439 PPPS, SUBSEQ VISIT: HCPCS | Performed by: FAMILY MEDICINE

## 2018-10-18 NOTE — PATIENT INSTRUCTIONS
Prevention Guidelines, Men Ages 72 and Older  Screening tests and vaccines are an important part of managing your health. A screening test is done to find possible disorders or diseases in people who don't have any symptoms.  The goal is to find a disease Hepatitis C Men at increased risk for infection – talk with your healthcare provider At routine exams   High cholesterol or triglycerides All men in this age group At least every 5 years   HIV Men at increased risk for infection – talk with your healthcare Pneumococcal conjugate vaccine (PCV13) and pneumococcal polysaccharide vaccine (PPSV23) All men in this age group 1 dose of each vaccine   Tetanus/diphtheria/  pertussis (Td/Tdap) booster All men in this age group Td every 10 years, or Tdap if you will hav

## 2018-10-18 NOTE — PROGRESS NOTES
REASON FOR VISIT:    Salvatore Bonilla is a 70year old male who presents for a Medicare Annual Wellness visit.     HPI:     Patient Care Team: Patient Care Team:  Rashid Davidson MD as PCP - General (Family Practice)  Lori Samaniego MD (OTOLARYNGOLOGY)  Be Total Cholesterol <200 mg/dL 155 154 164 162 159 162 173   Triglycerides 30 - 149 mg/dL 110 104 198(H) 160(H) 139 151(H) 194(H)   HDL 40 - 59 mg/dL 51 56 56 49 50 53 53   LDL <100 mg/dL 82 77 68 81 81 79 81     BUN and Cr Latest Ref Rng & Units 10/18/201 Yes     Functional Status     Hearing Problems?: Yes  Vision Problems? : Yes  Difficulty walking?: Yes  Difficulty dressing or bathing?: No  Problems with daily activities? : No  Memory Problems?: No      Fall/Risk Assessment     Have you fallen in the las for this or any previous visit.      SPECIFIC DISEASE MONITORING Internal Lab or Procedure     Annual Monitoring of Persistent Medications  (ACE/ARB, Digoxin, Diuretics)        Potassium  Annually Potassium (mmol/L)   Date Value   10/18/2018 3.9   03/13/200 feels well otherwise  SKIN: denies any unusual skin lesions  EYES: denies blurred vision or double vision  HEENT: denies nasal congestion, sinus pain or ST  LUNGS: denies shortness of breath with exertion  CARDIOVASCULAR: denies chest pain on exertion  GI: HORMONE  -     CBC WITH DIFFERENTIAL WITH PLATELET  -     CBC W/ DIFFERENTIAL    H/o prostate ca:  -     CBC WITH DIFFERENTIAL WITH PLATELET  -     PSA  -     CBC W/ DIFFERENTIAL      Galucoma: stable.     The patient indicates understanding of these issues

## 2019-02-20 ENCOUNTER — MED REC SCAN ONLY (OUTPATIENT)
Dept: FAMILY MEDICINE CLINIC | Facility: CLINIC | Age: 79
End: 2019-02-20

## 2019-03-03 ENCOUNTER — PATIENT MESSAGE (OUTPATIENT)
Dept: FAMILY MEDICINE CLINIC | Facility: CLINIC | Age: 79
End: 2019-03-03

## 2019-03-04 RX ORDER — COLCHICINE 0.6 MG/1
TABLET ORAL
Qty: 3 TABLET | Refills: 0 | Status: SHIPPED | OUTPATIENT
Start: 2019-03-04 | End: 2019-12-26 | Stop reason: ALTCHOICE

## 2019-03-04 NOTE — TELEPHONE ENCOUNTER
From: Naomy Vines  To: Sixto Manuel MD  Sent: 3/3/2019 11:33 AM CST  Subject: Prescription Question    Hi Dr Danny Benson I have had flare ups of GOUT in my big toe for several years in the past i have taken a low dose of COLCHICINE and it has helped in

## 2019-03-04 NOTE — TELEPHONE ENCOUNTER
Patient having a gout flare up and is requesting colchicine. Patient says he has been treated with this before. Does not appear we have ever prescribed. Please advise, thanks. LOV 10/18/18.

## 2019-06-24 ENCOUNTER — TELEPHONE (OUTPATIENT)
Dept: FAMILY MEDICINE CLINIC | Facility: CLINIC | Age: 79
End: 2019-06-24

## 2019-06-24 DIAGNOSIS — E03.9 ACQUIRED HYPOTHYROIDISM: ICD-10-CM

## 2019-06-24 RX ORDER — LEVOTHYROXINE SODIUM 0.05 MG/1
50 TABLET ORAL
Qty: 90 TABLET | Refills: 1 | Status: SHIPPED | OUTPATIENT
Start: 2019-06-24 | End: 2019-12-06

## 2019-06-24 NOTE — TELEPHONE ENCOUNTER
Last office visit:    (if over 1 year, schedule appointment) 10/18/18    Appointment scheduled with:     on .     Requested medication:  Levothyroxine Sodium 50 MCG Oral Tab for 90 days     Pharmacy: [facility] [address], [address]

## 2019-09-04 ENCOUNTER — OFFICE VISIT (OUTPATIENT)
Dept: FAMILY MEDICINE CLINIC | Facility: CLINIC | Age: 79
End: 2019-09-04
Payer: MEDICARE

## 2019-09-04 VITALS
OXYGEN SATURATION: 96 % | BODY MASS INDEX: 35.79 KG/M2 | SYSTOLIC BLOOD PRESSURE: 134 MMHG | DIASTOLIC BLOOD PRESSURE: 72 MMHG | WEIGHT: 236.19 LBS | TEMPERATURE: 99 F | HEART RATE: 87 BPM | RESPIRATION RATE: 20 BRPM | HEIGHT: 68 IN

## 2019-09-04 DIAGNOSIS — J20.9 ACUTE BRONCHITIS, UNSPECIFIED ORGANISM: Primary | ICD-10-CM

## 2019-09-04 DIAGNOSIS — J01.00 ACUTE NON-RECURRENT MAXILLARY SINUSITIS: ICD-10-CM

## 2019-09-04 PROCEDURE — 99214 OFFICE O/P EST MOD 30 MIN: CPT | Performed by: FAMILY MEDICINE

## 2019-09-04 RX ORDER — AZITHROMYCIN 250 MG/1
TABLET, FILM COATED ORAL
Qty: 6 TABLET | Refills: 0 | Status: SHIPPED | OUTPATIENT
Start: 2019-09-04 | End: 2019-12-26 | Stop reason: ALTCHOICE

## 2019-09-04 RX ORDER — CODEINE PHOSPHATE AND GUAIFENESIN 10; 100 MG/5ML; MG/5ML
5 SOLUTION ORAL 3 TIMES DAILY PRN
Qty: 120 ML | Refills: 0 | Status: SHIPPED | OUTPATIENT
Start: 2019-09-04 | End: 2019-12-26 | Stop reason: ALTCHOICE

## 2019-09-04 RX ORDER — PREDNISONE 20 MG/1
TABLET ORAL
Qty: 10 TABLET | Refills: 0 | Status: SHIPPED | OUTPATIENT
Start: 2019-09-04 | End: 2019-12-26 | Stop reason: ALTCHOICE

## 2019-09-04 NOTE — PROGRESS NOTES
Patient presents with:  Sinus Problem: pressure, PND, cough, chills - x4 days      HPI:   Aaron Honeycutt is a 70year old male who presents for cough  for  5  days. Patient reports congestion, dry cough, sinus pain, wheezing. Cough started gradually, tight, w Date   • Cancer [facility])     Prostate   • GERD (gastroesophageal reflux disease)    • History of cardioversion    • Hypothyroidism    • Sleep apnea     c pap    • SVT (supraventricular tachycardia) ([facility] [address] 2006    Aprox.  2006   • Thyroid disease    • Unspecifie rales, no crackles. Normal on percussion. No decreased BS. Normal on palpation,normal vocal fremitus. CARDIO: RRR without murmur  GI: good BS's,no masses, HSM or tenderness    ASSESSMENT AND PLAN:     Anju Smith was seen today for sinus problem.     Diagnoses and a

## 2019-10-21 ENCOUNTER — TELEPHONE (OUTPATIENT)
Dept: FAMILY MEDICINE CLINIC | Facility: CLINIC | Age: 79
End: 2019-10-21

## 2019-12-06 DIAGNOSIS — E03.9 ACQUIRED HYPOTHYROIDISM: ICD-10-CM

## 2019-12-07 NOTE — TELEPHONE ENCOUNTER
Medication(s) to Refill:   Requested Prescriptions     Pending Prescriptions Disp Refills   • LEVOTHYROXINE SODIUM 50 MCG Oral Tab [Pharmacy Med Name: LEVOTHYROXINE 0.05MG (50MCG) TAB] 90 tablet 0     Sig: TAKE 1 TABLET(50 MCG) BY MOUTH DAILY BEFORE BREAKF

## 2019-12-09 RX ORDER — LEVOTHYROXINE SODIUM 0.05 MG/1
TABLET ORAL
Qty: 90 TABLET | Refills: 0 | Status: SHIPPED | OUTPATIENT
Start: 2019-12-09 | End: 2020-03-13

## 2019-12-26 ENCOUNTER — TELEPHONE (OUTPATIENT)
Dept: FAMILY MEDICINE CLINIC | Facility: CLINIC | Age: 79
End: 2019-12-26

## 2019-12-26 ENCOUNTER — APPOINTMENT (OUTPATIENT)
Dept: LAB | Age: 79
End: 2019-12-26
Attending: FAMILY MEDICINE
Payer: MEDICARE

## 2019-12-26 ENCOUNTER — OFFICE VISIT (OUTPATIENT)
Dept: FAMILY MEDICINE CLINIC | Facility: CLINIC | Age: 79
End: 2019-12-26
Payer: MEDICARE

## 2019-12-26 VITALS
DIASTOLIC BLOOD PRESSURE: 86 MMHG | OXYGEN SATURATION: 95 % | BODY MASS INDEX: 35.46 KG/M2 | RESPIRATION RATE: 14 BRPM | HEIGHT: 68 IN | SYSTOLIC BLOOD PRESSURE: 126 MMHG | HEART RATE: 84 BPM | WEIGHT: 234 LBS | TEMPERATURE: 98 F

## 2019-12-26 DIAGNOSIS — E66.01 SEVERE OBESITY (HCC): ICD-10-CM

## 2019-12-26 DIAGNOSIS — K43.9 VENTRAL HERNIA WITHOUT OBSTRUCTION OR GANGRENE: ICD-10-CM

## 2019-12-26 DIAGNOSIS — Z12.5 SCREENING FOR PROSTATE CANCER: ICD-10-CM

## 2019-12-26 DIAGNOSIS — I10 HTN (HYPERTENSION), BENIGN: ICD-10-CM

## 2019-12-26 DIAGNOSIS — E03.9 ACQUIRED HYPOTHYROIDISM: ICD-10-CM

## 2019-12-26 DIAGNOSIS — Z23 NEED FOR PNEUMOCOCCAL VACCINATION: ICD-10-CM

## 2019-12-26 DIAGNOSIS — Z00.00 MEDICARE ANNUAL WELLNESS VISIT, SUBSEQUENT: Primary | ICD-10-CM

## 2019-12-26 PROCEDURE — 84443 ASSAY THYROID STIM HORMONE: CPT

## 2019-12-26 PROCEDURE — 90732 PPSV23 VACC 2 YRS+ SUBQ/IM: CPT | Performed by: FAMILY MEDICINE

## 2019-12-26 PROCEDURE — G0444 DEPRESSION SCREEN ANNUAL: HCPCS | Performed by: FAMILY MEDICINE

## 2019-12-26 PROCEDURE — G0009 ADMIN PNEUMOCOCCAL VACCINE: HCPCS | Performed by: FAMILY MEDICINE

## 2019-12-26 PROCEDURE — G0439 PPPS, SUBSEQ VISIT: HCPCS | Performed by: FAMILY MEDICINE

## 2019-12-26 PROCEDURE — 84439 ASSAY OF FREE THYROXINE: CPT

## 2019-12-26 RX ORDER — METOPROLOL SUCCINATE 50 MG/1
TABLET, EXTENDED RELEASE ORAL
COMMUNITY
Start: 2019-12-06

## 2019-12-26 RX ORDER — PREDNISOLONE ACETATE 10 MG/ML
SUSPENSION/ DROPS OPHTHALMIC
Refills: 0 | COMMUNITY
Start: 2019-09-17 | End: 2019-12-26 | Stop reason: ALTCHOICE

## 2019-12-26 RX ORDER — AMLODIPINE BESYLATE AND ATORVASTATIN CALCIUM 5; 10 MG/1; MG/1
5 TABLET, FILM COATED ORAL
COMMUNITY
End: 2020-01-21 | Stop reason: ALTCHOICE

## 2019-12-26 RX ORDER — DIFLUPREDNATE 0.5 MG/ML
1 EMULSION OPHTHALMIC
COMMUNITY
Start: 2018-04-10 | End: 2019-12-26 | Stop reason: ALTCHOICE

## 2019-12-26 RX ORDER — GABAPENTIN 300 MG/1
300 CAPSULE ORAL
COMMUNITY
End: 2019-12-26 | Stop reason: ALTCHOICE

## 2019-12-26 RX ORDER — PRAVASTATIN SODIUM 80 MG/1
80 TABLET ORAL
COMMUNITY
End: 2020-01-21 | Stop reason: ALTCHOICE

## 2019-12-26 RX ORDER — DORZOLAMIDE HYDROCHLORIDE AND TIMOLOL MALEATE 20; 5 MG/ML; MG/ML
1 SOLUTION/ DROPS OPHTHALMIC
COMMUNITY
Start: 2018-06-27 | End: 2019-12-26 | Stop reason: ALTCHOICE

## 2019-12-26 RX ORDER — OFLOXACIN 3 MG/ML
SOLUTION/ DROPS OPHTHALMIC
Refills: 0 | COMMUNITY
Start: 2019-09-17 | End: 2019-12-26 | Stop reason: ALTCHOICE

## 2019-12-26 RX ORDER — SPIRONOLACTONE AND HYDROCHLOROTHIAZIDE 25; 25 MG/1; MG/1
1 TABLET ORAL
COMMUNITY

## 2019-12-26 RX ORDER — ATROPINE SULFATE 10 MG/ML
1 SOLUTION/ DROPS OPHTHALMIC
COMMUNITY
Start: 2018-04-10 | End: 2020-01-21 | Stop reason: ALTCHOICE

## 2019-12-26 NOTE — PROGRESS NOTES
Normal results except borderline lipids. Low lipids diet recommended. Requested Prescriptions     Pending Prescriptions Disp Refills    potassium chloride (K-DUR, KLOR-CON) 20 mEq tablet 60 Tab 6     Sig: Take 1 Tab by mouth two (2) times a day.

## 2019-12-26 NOTE — PATIENT INSTRUCTIONS
Controlling High Blood Pressure  High blood pressure (hypertension) is often called the silent killer. This is because many people who have it, don’t know it.  High blood pressure can raise your risk of heart attack, stroke, heart disease, and heart failu · Ask your healthcare provider what weight range is healthiest for you. If you are overweight, a weight loss of only 3% to 5% of your body weight can help lower blood pressure.  Generally, a good weight loss goal is to lose 10% of your body weight in a year Eating salt (sodium) can make your body retain too much water. Excess water makes your heart work harder. Canned, packaged, and frozen foods are easy to prepare. But they are often high in sodium.  Here are some ideas for low-salt foods you can easily make Changing the way you eat can improve your health. It can lower your cholesterol and blood pressure, and help you stay at a healthy weight. Your diet doesn’t have to be bland and boring to be healthy.  Just watch your calories and follow these steps:    Step · Drink more water to match your fiber increase to help prevent constipation. Date Last Reviewed: 6/1/2017  © 4168-7530 [employer]. [facility] [facility], [address] [place] [address]. All rights reserved.  This information is not intended as a subs · Find sources of protein such as canned beans, tuna canned in water, eggs, low-fat or nonfat milk, and low-fat or nonfat cheese and yogurt. · Stay away from the snack foods! Don't be drawn in by all the chips, candy, soda, and sugar-filled cereals.   Date · Cream, whole milk, and powdered creamers  · Duaine Cordial, liver, lunch meats, ground meat, and canned fish in oil  · Sweets and foods made with butter, coconut or palm oil, or hydrogenated fats  Date Last Reviewed: 6/1/2017  © 3374-1315 [employer]

## 2019-12-26 NOTE — PROGRESS NOTES
REASON FOR VISIT:    Blossom Méndez is a 70year old male who presents for a Medicare Annual Wellness visit.     HPI:     Patient Care Team: Patient Care Team:  Maxwell Murguia MD as PCP - General (Family Practice)  Maxwell Murguia MD as PCP - Sylvie Arrieta Cholesterol Latest Ref Rng & Units 10/18/2018 12/11/2017 1/23/2017 5/26/2016 2/21/2015 2/24/2012 2/21/2011   Total Cholesterol <200 mg/dL 155 154 164 162 159 162 173   Triglycerides 30 - 149 mg/dL 110 104 198(H) 160(H) 139 151(H) 194(H)   HDL 40 - 59 m without help  Taking medications as prescribed: Able without help  Are you able to afford your medications?: Yes  Hearing Problems?: Yes     Functional Status     Hearing Problems?: Yes  Vision Problems? : No  Difficulty walking?: No  Difficulty dressing o Visit Annually yes   Immunizations     Zoster (Not covered by Medicare Part B) No orders found for this or any previous visit.      SPECIFIC DISEASE MONITORING Internal Lab or Procedure     Annual Monitoring of Persistent Medications  (ACE/ARB, Digoxin, Diu 0.0 standard drinks      Frequency: 2-4 times a month      Drinks per session: 1 or 2      Binge frequency: Never      Comment: social    Drug use: No       REVIEW OF SYSTEMS:   GENERAL: feels well otherwise  SKIN: denies any unusual skin lesions  EYES: de LIPID PANEL  -     COMP METABOLIC PANEL (14)   low sugar, low salt and  lipid diet, exercise 3 times a week d/w the pt. History of prostate cancer  -     PSA    Hypothyroid, goiter: pt sees ENT,       TSH and Free T4 [E]          Standing Status:  Fu

## 2019-12-26 NOTE — TELEPHONE ENCOUNTER
----- Message from Kassie Storey MD sent at 12/26/2019  3:20 PM CST -----  Normal results except borderline lipids. Low lipids diet recommended.

## 2020-01-22 PROBLEM — N39.3 SUI (STRESS URINARY INCONTINENCE), MALE: Status: ACTIVE | Noted: 2020-01-22

## 2020-02-17 ENCOUNTER — TELEPHONE (OUTPATIENT)
Dept: FAMILY MEDICINE CLINIC | Facility: CLINIC | Age: 80
End: 2020-02-17

## 2020-02-17 RX ORDER — OMEPRAZOLE 40 MG/1
CAPSULE, DELAYED RELEASE ORAL
Qty: 180 CAPSULE | Refills: 1 | Status: SHIPPED | OUTPATIENT
Start: 2020-02-17 | End: 2020-08-25

## 2020-03-13 DIAGNOSIS — E03.9 ACQUIRED HYPOTHYROIDISM: ICD-10-CM

## 2020-03-13 RX ORDER — LEVOTHYROXINE SODIUM 0.05 MG/1
TABLET ORAL
Qty: 90 TABLET | Refills: 0 | Status: SHIPPED | OUTPATIENT
Start: 2020-03-13 | End: 2020-06-19

## 2020-03-14 ENCOUNTER — MED REC SCAN ONLY (OUTPATIENT)
Dept: FAMILY MEDICINE CLINIC | Facility: CLINIC | Age: 80
End: 2020-03-14

## 2020-04-16 ENCOUNTER — MED REC SCAN ONLY (OUTPATIENT)
Dept: FAMILY MEDICINE CLINIC | Facility: CLINIC | Age: 80
End: 2020-04-16

## 2020-06-19 DIAGNOSIS — E03.9 ACQUIRED HYPOTHYROIDISM: ICD-10-CM

## 2020-06-19 RX ORDER — LEVOTHYROXINE SODIUM 0.05 MG/1
50 TABLET ORAL
Qty: 90 TABLET | Refills: 1 | Status: SHIPPED | OUTPATIENT
Start: 2020-06-19 | End: 2020-12-14

## 2020-06-19 NOTE — TELEPHONE ENCOUNTER
Medication(s) to Refill:   Requested Prescriptions     Pending Prescriptions Disp Refills   • Levothyroxine Sodium 50 MCG Oral Tab 90 tablet 0     Sig: Take 1 tablet (50 mcg total) by mouth every morning before breakfast.         Reason for Medication Refi

## 2020-08-24 NOTE — TELEPHONE ENCOUNTER
Medication(s) to Refill:   Requested Prescriptions     Pending Prescriptions Disp Refills   • Omeprazole 40 MG Oral Capsule Delayed Release 180 capsule 1     Sig: TAKE AS DIRECTED.  TAKE ONE CAPSULE BY MOUTH HALF AN HOUR BEFORE BREAKFAST AND TAKE ONE CAPSUL

## 2020-08-25 RX ORDER — OMEPRAZOLE 40 MG/1
CAPSULE, DELAYED RELEASE ORAL
Qty: 180 CAPSULE | Refills: 1 | Status: SHIPPED | OUTPATIENT
Start: 2020-08-25 | End: 2021-07-12

## 2020-10-20 ENCOUNTER — TELEPHONE (OUTPATIENT)
Dept: FAMILY MEDICINE CLINIC | Facility: CLINIC | Age: 80
End: 2020-10-20

## 2020-10-20 NOTE — TELEPHONE ENCOUNTER
Patient will be undergoing a glaucoma surgery with Dr Tato De La Cruz on 11/6/10. Pre-op scheduled for 10/22/20 with Dr. Katina Frank.   Pink sheet completed and forwarded to Dr. Katina Frank

## 2020-10-22 ENCOUNTER — OFFICE VISIT (OUTPATIENT)
Dept: FAMILY MEDICINE CLINIC | Facility: CLINIC | Age: 80
End: 2020-10-22
Payer: MEDICARE

## 2020-10-22 ENCOUNTER — TELEPHONE (OUTPATIENT)
Dept: FAMILY MEDICINE CLINIC | Facility: CLINIC | Age: 80
End: 2020-10-22

## 2020-10-22 VITALS
OXYGEN SATURATION: 94 % | RESPIRATION RATE: 14 BRPM | HEART RATE: 66 BPM | HEIGHT: 68 IN | DIASTOLIC BLOOD PRESSURE: 84 MMHG | SYSTOLIC BLOOD PRESSURE: 124 MMHG | TEMPERATURE: 97 F | BODY MASS INDEX: 34.4 KG/M2 | WEIGHT: 227 LBS

## 2020-10-22 DIAGNOSIS — I10 HTN (HYPERTENSION), BENIGN: ICD-10-CM

## 2020-10-22 DIAGNOSIS — H40.89 OTHER GLAUCOMA OF RIGHT EYE: Primary | ICD-10-CM

## 2020-10-22 DIAGNOSIS — Z12.5 SCREENING FOR PROSTATE CANCER: ICD-10-CM

## 2020-10-22 DIAGNOSIS — E66.01 SEVERE OBESITY (HCC): ICD-10-CM

## 2020-10-22 DIAGNOSIS — E03.9 ACQUIRED HYPOTHYROIDISM: ICD-10-CM

## 2020-10-22 DIAGNOSIS — Z01.818 PREOP EXAMINATION: ICD-10-CM

## 2020-10-22 PROCEDURE — 99214 OFFICE O/P EST MOD 30 MIN: CPT | Performed by: FAMILY MEDICINE

## 2020-10-22 RX ORDER — TIMOLOL MALEATE 5 MG/ML
SOLUTION/ DROPS OPHTHALMIC
COMMUNITY
Start: 2020-09-09 | End: 2021-02-11 | Stop reason: ALTCHOICE

## 2020-10-22 NOTE — TELEPHONE ENCOUNTER
Pt is having eye sx with Dr. El Marshall @ [facility] on 11/06/2020. H&P was completed and faxed to [phone].

## 2020-10-22 NOTE — PROGRESS NOTES
Tate Shelton is a 68year old male who presents for a pre-operative physical exam.   HPI related to surgery:   Tate Shelton is scheduled for a Right eye tube shunt procedure at [facility] on 11-06-20 to be performed by Dr Gerard Rueda.      Indication: R total) by mouth every morning before breakfast. 90 tablet 1   • Metoprolol Succinate ER 50 MG Oral Tablet 24 Hr TK 1 T PO QD     • Spironolactone-HCTZ 25-25 MG Oral Tab Take 1 tablet by mouth.      • Multiple Vitamins-Minerals (CENTRUM SILVER) Oral Tab Take tenderness  Neurologic: No focal neurological deficits. Musculoskeletal: Full range of motion of all extremities. No swelling noted. Integument: No lesions. No erythema. Psychiatric: Appropriate mood and affect.     LABORATORY DATA:   Due for annual lab

## 2020-11-03 ENCOUNTER — APPOINTMENT (OUTPATIENT)
Dept: LAB | Age: 80
End: 2020-11-03
Attending: OPHTHALMOLOGY
Payer: MEDICARE

## 2020-11-03 DIAGNOSIS — Z01.818 PRE-PROCEDURAL EXAMINATION: ICD-10-CM

## 2020-12-14 DIAGNOSIS — E03.9 ACQUIRED HYPOTHYROIDISM: ICD-10-CM

## 2020-12-14 RX ORDER — LEVOTHYROXINE SODIUM 0.05 MG/1
50 TABLET ORAL
Qty: 90 TABLET | Refills: 1 | Status: SHIPPED | OUTPATIENT
Start: 2020-12-14 | End: 2021-06-12

## 2020-12-14 NOTE — TELEPHONE ENCOUNTER
Medication(s) to Refill:   Requested Prescriptions     Pending Prescriptions Disp Refills   • Levothyroxine Sodium 50 MCG Oral Tab 90 tablet 1     Sig: Take 1 tablet (50 mcg total) by mouth every morning before breakfast.         Reason for Medication Refi

## 2020-12-30 ENCOUNTER — HOSPITAL ENCOUNTER (OUTPATIENT)
Age: 80
Discharge: HOME | End: 2020-12-30
Payer: MEDICARE

## 2020-12-30 VITALS
WEIGHT: 220 LBS | HEIGHT: 68 IN | TEMPERATURE: 98 F | RESPIRATION RATE: 20 BRPM | HEART RATE: 60 BPM | BODY MASS INDEX: 33.34 KG/M2 | SYSTOLIC BLOOD PRESSURE: 149 MMHG | DIASTOLIC BLOOD PRESSURE: 78 MMHG | OXYGEN SATURATION: 95 %

## 2020-12-30 DIAGNOSIS — H61.23 BILATERAL IMPACTED CERUMEN: Primary | ICD-10-CM

## 2020-12-30 PROCEDURE — 99202 OFFICE O/P NEW SF 15 MIN: CPT

## 2020-12-30 PROCEDURE — 69209 REMOVE IMPACTED EAR WAX UNI: CPT

## 2020-12-30 NOTE — ED PROVIDER NOTES
Patient Seen in: [facility]      History   Patient presents with:  Ear Problem Pain    Stated Complaint: loss of hearing    HPI  Patient is 24-year-old male past medical history of hypertension, hypothyroidism, SVT, GERD, prostate cancer Resp 20   Ht 172.7 cm (5' 8\")   Wt 99.8 kg   SpO2 95%   BMI 33.45 kg/m²         Physical Exam  Vitals signs and nursing note reviewed. Constitutional:       General: He is not in acute distress. Appearance: Normal appearance. He is well-developed. softening otic drops.   Follow-up with PCP or seek immediate medical attention for concerns                               Disposition and Plan     Clinical Impression:  Bilateral impacted cerumen  (primary encounter diagnosis)    Disposition:  Discharge  12

## 2020-12-30 NOTE — ED INITIAL ASSESSMENT (HPI)
Pt feels like there is fluid in R ear , hard time hearing out of it x 2 days; no fever, drainage    Pt states hearing aid is intact and has tried switching batteries    Pt had R eye surgery 2 months ago - on steroid eye drops

## 2021-01-28 ENCOUNTER — TELEPHONE (OUTPATIENT)
Dept: FAMILY MEDICINE CLINIC | Facility: CLINIC | Age: 81
End: 2021-01-28

## 2021-01-28 ENCOUNTER — LAB ENCOUNTER (OUTPATIENT)
Dept: LAB | Age: 81
End: 2021-01-28
Attending: FAMILY MEDICINE
Payer: MEDICARE

## 2021-01-28 DIAGNOSIS — Z12.5 SCREENING FOR PROSTATE CANCER: ICD-10-CM

## 2021-01-28 LAB
ALBUMIN SERPL-MCNC: 3.5 G/DL (ref 3.4–5)
ALBUMIN/GLOB SERPL: 1 {RATIO} (ref 1–2)
ALP LIVER SERPL-CCNC: 75 U/L
ALT SERPL-CCNC: 33 U/L
ANION GAP SERPL CALC-SCNC: 4 MMOL/L (ref 0–18)
AST SERPL-CCNC: 23 U/L (ref 15–37)
BASOPHILS # BLD AUTO: 0.03 X10(3) UL (ref 0–0.2)
BASOPHILS NFR BLD AUTO: 0.4 %
BILIRUB SERPL-MCNC: 1.4 MG/DL (ref 0.1–2)
BUN BLD-MCNC: 17 MG/DL (ref 7–18)
BUN/CREAT SERPL: 22.1 (ref 10–20)
CALCIUM BLD-MCNC: 9.7 MG/DL (ref 8.5–10.1)
CHLORIDE SERPL-SCNC: 105 MMOL/L (ref 98–112)
CHOLEST SMN-MCNC: 142 MG/DL (ref ?–200)
CO2 SERPL-SCNC: 30 MMOL/L (ref 21–32)
COMPLEXED PSA SERPL-MCNC: <0.01 NG/ML (ref ?–4)
CREAT BLD-MCNC: 0.77 MG/DL
DEPRECATED RDW RBC AUTO: 47.8 FL (ref 35.1–46.3)
EOSINOPHIL # BLD AUTO: 0.26 X10(3) UL (ref 0–0.7)
EOSINOPHIL NFR BLD AUTO: 3.8 %
ERYTHROCYTE [DISTWIDTH] IN BLOOD BY AUTOMATED COUNT: 13.9 % (ref 11–15)
GLOBULIN PLAS-MCNC: 3.4 G/DL (ref 2.8–4.4)
GLUCOSE BLD-MCNC: 96 MG/DL (ref 70–99)
HCT VFR BLD AUTO: 48.6 %
HDLC SERPL-MCNC: 49 MG/DL (ref 40–59)
HGB BLD-MCNC: 15.7 G/DL
IMM GRANULOCYTES # BLD AUTO: 0.01 X10(3) UL (ref 0–1)
IMM GRANULOCYTES NFR BLD: 0.1 %
LDLC SERPL CALC-MCNC: 65 MG/DL (ref ?–100)
LYMPHOCYTES # BLD AUTO: 2 X10(3) UL (ref 1–4)
LYMPHOCYTES NFR BLD AUTO: 29.5 %
M PROTEIN MFR SERPL ELPH: 6.9 G/DL (ref 6.4–8.2)
MCH RBC QN AUTO: 30.4 PG (ref 26–34)
MCHC RBC AUTO-ENTMCNC: 32.3 G/DL (ref 31–37)
MCV RBC AUTO: 94 FL
MONOCYTES # BLD AUTO: 0.63 X10(3) UL (ref 0.1–1)
MONOCYTES NFR BLD AUTO: 9.3 %
NEUTROPHILS # BLD AUTO: 3.85 X10 (3) UL (ref 1.5–7.7)
NEUTROPHILS # BLD AUTO: 3.85 X10(3) UL (ref 1.5–7.7)
NEUTROPHILS NFR BLD AUTO: 56.9 %
NONHDLC SERPL-MCNC: 93 MG/DL (ref ?–130)
OSMOLALITY SERPL CALC.SUM OF ELEC: 289 MOSM/KG (ref 275–295)
PATIENT FASTING Y/N/NP: YES
PATIENT FASTING Y/N/NP: YES
PLATELET # BLD AUTO: 211 10(3)UL (ref 150–450)
POTASSIUM SERPL-SCNC: 3.9 MMOL/L (ref 3.5–5.1)
RBC # BLD AUTO: 5.17 X10(6)UL
SODIUM SERPL-SCNC: 139 MMOL/L (ref 136–145)
TRIGL SERPL-MCNC: 138 MG/DL (ref 30–149)
TSI SER-ACNC: 1.95 MIU/ML (ref 0.36–3.74)
VLDLC SERPL CALC-MCNC: 28 MG/DL (ref 0–30)
WBC # BLD AUTO: 6.8 X10(3) UL (ref 4–11)

## 2021-01-28 NOTE — TELEPHONE ENCOUNTER
----- Message from Maxi Navarro MD sent at 1/28/2021  1:56 PM CST -----  Results reviewed. Annual labs stable.

## 2021-02-11 ENCOUNTER — OFFICE VISIT (OUTPATIENT)
Dept: FAMILY MEDICINE CLINIC | Facility: CLINIC | Age: 81
End: 2021-02-11
Payer: MEDICARE

## 2021-02-11 VITALS
HEART RATE: 60 BPM | WEIGHT: 227 LBS | RESPIRATION RATE: 18 BRPM | TEMPERATURE: 97 F | OXYGEN SATURATION: 98 % | DIASTOLIC BLOOD PRESSURE: 80 MMHG | BODY MASS INDEX: 34.4 KG/M2 | SYSTOLIC BLOOD PRESSURE: 136 MMHG | HEIGHT: 68 IN

## 2021-02-11 DIAGNOSIS — N39.3 SUI (STRESS URINARY INCONTINENCE), MALE: ICD-10-CM

## 2021-02-11 DIAGNOSIS — I10 HTN (HYPERTENSION), BENIGN: ICD-10-CM

## 2021-02-11 DIAGNOSIS — K43.9 VENTRAL HERNIA WITHOUT OBSTRUCTION OR GANGRENE: ICD-10-CM

## 2021-02-11 DIAGNOSIS — E03.9 ACQUIRED HYPOTHYROIDISM: ICD-10-CM

## 2021-02-11 DIAGNOSIS — E04.2 GOITER, NONTOXIC, MULTINODULAR: ICD-10-CM

## 2021-02-11 DIAGNOSIS — Z85.46 HISTORY OF PROSTATE CANCER: ICD-10-CM

## 2021-02-11 DIAGNOSIS — D23.4 DERMOID CYST OF SCALP: ICD-10-CM

## 2021-02-11 DIAGNOSIS — Z00.00 MEDICARE ANNUAL WELLNESS VISIT, SUBSEQUENT: Primary | ICD-10-CM

## 2021-02-11 DIAGNOSIS — Z90.79 STATUS POST PROSTATECTOMY: ICD-10-CM

## 2021-02-11 DIAGNOSIS — H40.9 GLAUCOMA OF BOTH EYES, UNSPECIFIED GLAUCOMA TYPE: ICD-10-CM

## 2021-02-11 PROBLEM — E66.01 SEVERE OBESITY (HCC): Status: RESOLVED | Noted: 2017-02-10 | Resolved: 2021-02-11

## 2021-02-11 PROCEDURE — G0439 PPPS, SUBSEQ VISIT: HCPCS | Performed by: FAMILY MEDICINE

## 2021-02-11 PROCEDURE — 99213 OFFICE O/P EST LOW 20 MIN: CPT | Performed by: FAMILY MEDICINE

## 2021-02-11 PROCEDURE — G0444 DEPRESSION SCREEN ANNUAL: HCPCS | Performed by: FAMILY MEDICINE

## 2021-02-11 NOTE — PATIENT INSTRUCTIONS
[facility]    Dr. Aidee Kwok    [address]  [facility], [address]      [address], [address]  [address]

## 2021-03-04 ENCOUNTER — OFFICE VISIT (OUTPATIENT)
Dept: SURGERY | Facility: CLINIC | Age: 81
End: 2021-03-04
Payer: MEDICARE

## 2021-03-04 VITALS — HEIGHT: 68 IN | BODY MASS INDEX: 34.1 KG/M2 | WEIGHT: 225 LBS | TEMPERATURE: 97 F

## 2021-03-04 DIAGNOSIS — L98.9 SKIN LESION: Primary | ICD-10-CM

## 2021-03-04 PROCEDURE — 99203 OFFICE O/P NEW LOW 30 MIN: CPT | Performed by: SURGERY

## 2021-03-09 DIAGNOSIS — Z23 NEED FOR VACCINATION: ICD-10-CM

## 2021-03-10 NOTE — H&P (VIEW-ONLY)
New Patient Visit Note       Active Problems      1. Skin lesion        Chief Complaint   Patient presents with:  Cyst: NP scalp dermoid cyst- PT states cyst is on the right side, has increased in size over the past year. PT denies pain or drainag.  PT stat SILVER) Oral Tab, Take 1 tablet by mouth daily. , Disp: , Rfl:   •  Atorvastatin Calcium (LIPITOR) 10 MG Oral Tab, Take 10 mg by mouth daily. , Disp: , Rfl:   •  ASPIRIN 81 MG OR TABS, daily, Disp: , Rfl:   •  COQ10 30 MG OR CAPS, daily, Disp: , Rfl:   •  Tu tenderness. Musculoskeletal:         General: No deformity. Normal range of motion. Skin:     General: Skin is warm and dry. Findings: No erythema or rash. Comments: Behind the right ear there is a 2 cm protuberant mass.   No overlying

## 2021-03-16 ENCOUNTER — EKG ENCOUNTER (OUTPATIENT)
Dept: LAB | Age: 81
End: 2021-03-16
Attending: SURGERY
Payer: MEDICARE

## 2021-03-16 DIAGNOSIS — L98.9 SKIN LESION: ICD-10-CM

## 2021-03-16 LAB
ATRIAL RATE: 60 BPM
P AXIS: 1 DEGREES
P-R INTERVAL: 222 MS
Q-T INTERVAL: 406 MS
QRS DURATION: 88 MS
QTC CALCULATION (BEZET): 406 MS
R AXIS: 2 DEGREES
T AXIS: 21 DEGREES
VENTRICULAR RATE: 60 BPM

## 2021-03-16 PROCEDURE — 93010 ELECTROCARDIOGRAM REPORT: CPT | Performed by: INTERNAL MEDICINE

## 2021-03-16 PROCEDURE — 93005 ELECTROCARDIOGRAM TRACING: CPT

## 2021-03-26 ENCOUNTER — LAB ENCOUNTER (OUTPATIENT)
Dept: LAB | Age: 81
End: 2021-03-26
Attending: SURGERY
Payer: MEDICARE

## 2021-03-26 DIAGNOSIS — L98.9 SKIN LESION: ICD-10-CM

## 2021-03-27 LAB — SARS-COV-2 RNA RESP QL NAA+PROBE: NOT DETECTED

## 2021-03-29 ENCOUNTER — TELEPHONE (OUTPATIENT)
Dept: SURGERY | Facility: CLINIC | Age: 81
End: 2021-03-29

## 2021-03-29 ENCOUNTER — ANESTHESIA EVENT (OUTPATIENT)
Dept: SURGERY | Facility: HOSPITAL | Age: 81
End: 2021-03-29
Payer: MEDICARE

## 2021-03-29 ENCOUNTER — HOSPITAL ENCOUNTER (OUTPATIENT)
Facility: HOSPITAL | Age: 81
Setting detail: HOSPITAL OUTPATIENT SURGERY
Discharge: HOME | End: 2021-03-29
Attending: SURGERY | Admitting: SURGERY
Payer: MEDICARE

## 2021-03-29 ENCOUNTER — ANESTHESIA (OUTPATIENT)
Dept: SURGERY | Facility: HOSPITAL | Age: 81
End: 2021-03-29
Payer: MEDICARE

## 2021-03-29 VITALS
HEIGHT: 68 IN | SYSTOLIC BLOOD PRESSURE: 117 MMHG | OXYGEN SATURATION: 99 % | RESPIRATION RATE: 16 BRPM | DIASTOLIC BLOOD PRESSURE: 67 MMHG | BODY MASS INDEX: 33.75 KG/M2 | WEIGHT: 222.69 LBS | HEART RATE: 58 BPM | TEMPERATURE: 98 F

## 2021-03-29 DIAGNOSIS — L98.9 SKIN LESION: Primary | ICD-10-CM

## 2021-03-29 PROCEDURE — 0HB0XZZ EXCISION OF SCALP SKIN, EXTERNAL APPROACH: ICD-10-PCS | Performed by: SURGERY

## 2021-03-29 PROCEDURE — 88304 TISSUE EXAM BY PATHOLOGIST: CPT | Performed by: SURGERY

## 2021-03-29 RX ORDER — CEFAZOLIN SODIUM/WATER 2 G/20 ML
SYRINGE (ML) INTRAVENOUS
Status: DISCONTINUED
Start: 2021-03-29 | End: 2021-03-29

## 2021-03-29 RX ORDER — LIDOCAINE HYDROCHLORIDE AND EPINEPHRINE 10; 10 MG/ML; UG/ML
INJECTION, SOLUTION INFILTRATION; PERINEURAL AS NEEDED
Status: DISCONTINUED | OUTPATIENT
Start: 2021-03-29 | End: 2021-03-29 | Stop reason: HOSPADM

## 2021-03-29 RX ORDER — DEXAMETHASONE SODIUM PHOSPHATE 4 MG/ML
VIAL (ML) INJECTION AS NEEDED
Status: DISCONTINUED | OUTPATIENT
Start: 2021-03-29 | End: 2021-03-29 | Stop reason: SURG

## 2021-03-29 RX ORDER — HYDROCODONE BITARTRATE AND ACETAMINOPHEN 5; 325 MG/1; MG/1
2 TABLET ORAL AS NEEDED
Status: CANCELLED | OUTPATIENT
Start: 2021-03-29

## 2021-03-29 RX ORDER — ONDANSETRON 2 MG/ML
4 INJECTION INTRAMUSCULAR; INTRAVENOUS AS NEEDED
Status: CANCELLED | OUTPATIENT
Start: 2021-03-29 | End: 2021-03-29

## 2021-03-29 RX ORDER — HYDROCODONE BITARTRATE AND ACETAMINOPHEN 5; 325 MG/1; MG/1
1 TABLET ORAL AS NEEDED
Status: CANCELLED | OUTPATIENT
Start: 2021-03-29

## 2021-03-29 RX ORDER — BUPIVACAINE HYDROCHLORIDE 5 MG/ML
INJECTION, SOLUTION EPIDURAL; INTRACAUDAL AS NEEDED
Status: DISCONTINUED | OUTPATIENT
Start: 2021-03-29 | End: 2021-03-29 | Stop reason: HOSPADM

## 2021-03-29 RX ORDER — ONDANSETRON 2 MG/ML
INJECTION INTRAMUSCULAR; INTRAVENOUS AS NEEDED
Status: DISCONTINUED | OUTPATIENT
Start: 2021-03-29 | End: 2021-03-29 | Stop reason: SURG

## 2021-03-29 RX ORDER — LABETALOL HYDROCHLORIDE 5 MG/ML
5 INJECTION, SOLUTION INTRAVENOUS EVERY 5 MIN PRN
Status: CANCELLED | OUTPATIENT
Start: 2021-03-29 | End: 2021-03-29

## 2021-03-29 RX ORDER — SODIUM CHLORIDE, SODIUM LACTATE, POTASSIUM CHLORIDE, CALCIUM CHLORIDE 600; 310; 30; 20 MG/100ML; MG/100ML; MG/100ML; MG/100ML
INJECTION, SOLUTION INTRAVENOUS CONTINUOUS
Status: DISCONTINUED | OUTPATIENT
Start: 2021-03-29 | End: 2021-03-29

## 2021-03-29 RX ORDER — CEFAZOLIN SODIUM/WATER 2 G/20 ML
2 SYRINGE (ML) INTRAVENOUS ONCE
Status: COMPLETED | OUTPATIENT
Start: 2021-03-29 | End: 2021-03-29

## 2021-03-29 RX ORDER — ACETAMINOPHEN 500 MG
1000 TABLET ORAL ONCE AS NEEDED
Status: CANCELLED | OUTPATIENT
Start: 2021-03-29 | End: 2021-03-29

## 2021-03-29 RX ORDER — HEPARIN SODIUM 5000 [USP'U]/ML
INJECTION, SOLUTION INTRAVENOUS; SUBCUTANEOUS
Status: COMPLETED
Start: 2021-03-29 | End: 2021-03-29

## 2021-03-29 RX ORDER — SODIUM CHLORIDE, SODIUM LACTATE, POTASSIUM CHLORIDE, CALCIUM CHLORIDE 600; 310; 30; 20 MG/100ML; MG/100ML; MG/100ML; MG/100ML
INJECTION, SOLUTION INTRAVENOUS CONTINUOUS
Status: CANCELLED | OUTPATIENT
Start: 2021-03-29

## 2021-03-29 RX ORDER — ACETAMINOPHEN 500 MG
1000 TABLET ORAL ONCE
Status: DISCONTINUED | OUTPATIENT
Start: 2021-03-29 | End: 2021-03-29

## 2021-03-29 RX ORDER — HEPARIN SODIUM 5000 [USP'U]/ML
5000 INJECTION, SOLUTION INTRAVENOUS; SUBCUTANEOUS ONCE
Status: COMPLETED | OUTPATIENT
Start: 2021-03-29 | End: 2021-03-29

## 2021-03-29 RX ORDER — LIDOCAINE HYDROCHLORIDE 10 MG/ML
INJECTION, SOLUTION EPIDURAL; INFILTRATION; INTRACAUDAL; PERINEURAL AS NEEDED
Status: DISCONTINUED | OUTPATIENT
Start: 2021-03-29 | End: 2021-03-29 | Stop reason: SURG

## 2021-03-29 RX ORDER — HYDROMORPHONE HYDROCHLORIDE 1 MG/ML
0.4 INJECTION, SOLUTION INTRAMUSCULAR; INTRAVENOUS; SUBCUTANEOUS EVERY 5 MIN PRN
Status: CANCELLED | OUTPATIENT
Start: 2021-03-29 | End: 2021-03-29

## 2021-03-29 RX ORDER — ACETAMINOPHEN 500 MG
500 TABLET ORAL EVERY 6 HOURS PRN
COMMUNITY

## 2021-03-29 RX ORDER — NALOXONE HYDROCHLORIDE 0.4 MG/ML
80 INJECTION, SOLUTION INTRAMUSCULAR; INTRAVENOUS; SUBCUTANEOUS AS NEEDED
Status: CANCELLED | OUTPATIENT
Start: 2021-03-29 | End: 2021-03-29

## 2021-03-29 RX ADMIN — ONDANSETRON 4 MG: 2 INJECTION INTRAMUSCULAR; INTRAVENOUS at 15:17:00

## 2021-03-29 RX ADMIN — SODIUM CHLORIDE, SODIUM LACTATE, POTASSIUM CHLORIDE, CALCIUM CHLORIDE: 600; 310; 30; 20 INJECTION, SOLUTION INTRAVENOUS at 15:12:00

## 2021-03-29 RX ADMIN — CEFAZOLIN SODIUM/WATER 2 G: 2 G/20 ML SYRINGE (ML) INTRAVENOUS at 15:19:00

## 2021-03-29 RX ADMIN — DEXAMETHASONE SODIUM PHOSPHATE 4 MG: 4 MG/ML VIAL (ML) INJECTION at 15:17:00

## 2021-03-29 RX ADMIN — SODIUM CHLORIDE, SODIUM LACTATE, POTASSIUM CHLORIDE, CALCIUM CHLORIDE: 600; 310; 30; 20 INJECTION, SOLUTION INTRAVENOUS at 15:42:00

## 2021-03-29 RX ADMIN — LIDOCAINE HYDROCHLORIDE 50 MG: 10 INJECTION, SOLUTION EPIDURAL; INFILTRATION; INTRACAUDAL; PERINEURAL at 15:17:00

## 2021-03-29 NOTE — OPERATIVE REPORT
[facility]  Operative Note    Eric Alarconntleno Location: OR   [facility] [id number] MRN [id number]   Admission Date 3/29/2021 Operation Date 3/29/2021   Attending Physician Lisa Rodriguez MD Operating Physician Sulema Silvestre MD     Date of procedure:  3-29-20 area.    Summary of case: The patient was taken to the operating room and placed on the OR table in a lateral decubitus position. After the induction of anesthesia, rahul-operative antibiotics were given.   The patient was prepped and draped in usual steril

## 2021-03-29 NOTE — ANESTHESIA POSTPROCEDURE EVALUATION
[address] Patient Status:  Hospital Outpatient Surgery   Age/Gender 68year old male MRN [id number]   [facility] SURGERY Attending Lisa Shown, [address] PCP Nima Geiger MD       Anesthesia Post-op Note

## 2021-03-29 NOTE — TELEPHONE ENCOUNTER
Oralia GREEN from the floor contacted the office asking if VERONICA would be able to contact the PT.'s wife regarding how surgery went. Oralia gave me the phone number of [phone] to have [address] call Farrell Olszewski back at. I v/u and message was sent to [facility] and PA's. To Dr. Armas - please advise

## 2021-03-29 NOTE — INTERVAL H&P NOTE
Pre-op Diagnosis: Skin lesion [L98.9]    The above referenced H&P was reviewed by Mendez Miranda MD on 3/29/2021, the patient was examined and no significant changes have occurred in the patient's condition since the H&P was performed.   I discussed with t

## 2021-03-29 NOTE — ANESTHESIA PREPROCEDURE EVALUATION
PRE-OP EVALUATION    Patient Name: Rudy Cardenas    Admit Diagnosis: Skin lesion [L98.9]    Pre-op Diagnosis: Skin lesion [L98.9]    EXCISION SKIN CYST RIGHT TEMPORAL REGION    Anesthesia Procedure: EXCISION SKIN CYST RIGHT TEMPORAL REGION (Right )    Surge 3/22/2021        Allergies: Patient has no known allergies. Anesthesia Evaluation    Patient summary reviewed.     Anesthetic Complications           GI/Hepatic/Renal      (+) GERD                           Cardiovascular                  (+) hypertens normal.         Dental      Dental appliance(s): lower dentures and upper dentures       Pulmonary    Pulmonary exam normal.                 Other findings            ASA: 2   Plan: MAC  NPO status verified and patient meets guidelines.         Comment: Chasity

## 2021-04-07 ENCOUNTER — OFFICE VISIT (OUTPATIENT)
Dept: SURGERY | Facility: CLINIC | Age: 81
End: 2021-04-07

## 2021-04-07 VITALS
SYSTOLIC BLOOD PRESSURE: 151 MMHG | HEIGHT: 68 IN | BODY MASS INDEX: 33.65 KG/M2 | WEIGHT: 222 LBS | HEART RATE: 66 BPM | DIASTOLIC BLOOD PRESSURE: 82 MMHG | TEMPERATURE: 98 F

## 2021-04-07 DIAGNOSIS — L72.0 EPIDERMOID CYST: Primary | ICD-10-CM

## 2021-04-07 PROCEDURE — 99024 POSTOP FOLLOW-UP VISIT: CPT | Performed by: PHYSICIAN ASSISTANT

## 2021-04-07 NOTE — PROGRESS NOTES
Post Operative Visit Note       Active Problems  1. Epidermoid cyst         Chief Complaint   Patient presents with:  Post-Op: P/O - 3/29 temporal cyst removed. Pt states he seems to be healing well. Pt denies any bleeding or drainage.  Pt denies any swelli Socioeconomic History      Marital status:       Spouse name: Not on file      Number of children: Not on file      Years of education: Not on file      Highest education level: Not on file    Tobacco Use      Smoking status: Former Smoker        Ty apnea, cough, shortness of breath and wheezing. Cardiovascular: Negative for chest pain, palpitations and leg swelling.    Gastrointestinal: Negative for abdominal distention, abdominal pain, anal bleeding, blood in stool, constipation, diarrhea, nausea warm and dry. Coloration: Skin is not jaundiced or pale. Findings: No erythema or rash. Neurological:      General: No focal deficit present. Mental Status: He is alert and oriented to person, place, and time.    Psychiatric:         Mood a

## 2021-06-11 DIAGNOSIS — E03.9 ACQUIRED HYPOTHYROIDISM: ICD-10-CM

## 2021-06-12 RX ORDER — LEVOTHYROXINE SODIUM 0.05 MG/1
TABLET ORAL
Qty: 90 TABLET | Refills: 1 | Status: SHIPPED | OUTPATIENT
Start: 2021-06-12 | End: 2021-11-30

## 2021-07-12 RX ORDER — OMEPRAZOLE 40 MG/1
CAPSULE, DELAYED RELEASE ORAL
Qty: 180 CAPSULE | Refills: 0 | Status: SHIPPED | OUTPATIENT
Start: 2021-07-12 | End: 2021-10-11

## 2021-08-11 ENCOUNTER — APPOINTMENT (OUTPATIENT)
Dept: CT IMAGING | Facility: HOSPITAL | Age: 81
End: 2021-08-11
Attending: EMERGENCY MEDICINE
Payer: MEDICARE

## 2021-08-11 ENCOUNTER — APPOINTMENT (OUTPATIENT)
Dept: ULTRASOUND IMAGING | Facility: HOSPITAL | Age: 81
End: 2021-08-11
Attending: EMERGENCY MEDICINE
Payer: MEDICARE

## 2021-08-11 ENCOUNTER — HOSPITAL ENCOUNTER (EMERGENCY)
Facility: HOSPITAL | Age: 81
Discharge: ANOTHER HOSPITAL | End: 2021-08-11
Attending: EMERGENCY MEDICINE
Payer: MEDICARE

## 2021-08-11 VITALS
DIASTOLIC BLOOD PRESSURE: 79 MMHG | TEMPERATURE: 98 F | OXYGEN SATURATION: 98 % | HEART RATE: 60 BPM | WEIGHT: 222 LBS | BODY MASS INDEX: 33.65 KG/M2 | HEIGHT: 68 IN | SYSTOLIC BLOOD PRESSURE: 144 MMHG | RESPIRATION RATE: 16 BRPM

## 2021-08-11 DIAGNOSIS — R10.9 LEFT FLANK PAIN: Primary | ICD-10-CM

## 2021-08-11 DIAGNOSIS — I80.02 THROMBOPHLEBITIS OF SUPERFICIAL VEINS OF LEFT LOWER EXTREMITY: ICD-10-CM

## 2021-08-11 LAB
ALBUMIN SERPL-MCNC: 3.6 G/DL (ref 3.4–5)
ALBUMIN/GLOB SERPL: 1.1 {RATIO} (ref 1–2)
ALP LIVER SERPL-CCNC: 80 U/L
ALT SERPL-CCNC: 36 U/L
ANION GAP SERPL CALC-SCNC: 5 MMOL/L (ref 0–18)
AST SERPL-CCNC: 21 U/L (ref 15–37)
BASOPHILS # BLD AUTO: 0.03 X10(3) UL (ref 0–0.2)
BASOPHILS NFR BLD AUTO: 0.4 %
BILIRUB SERPL-MCNC: 1.2 MG/DL (ref 0.1–2)
BILIRUB UR QL STRIP.AUTO: NEGATIVE
BUN BLD-MCNC: 18 MG/DL (ref 7–18)
CALCIUM BLD-MCNC: 9.4 MG/DL (ref 8.5–10.1)
CHLORIDE SERPL-SCNC: 105 MMOL/L (ref 98–112)
CLARITY UR REFRACT.AUTO: CLEAR
CO2 SERPL-SCNC: 30 MMOL/L (ref 21–32)
COLOR UR AUTO: YELLOW
CREAT BLD-MCNC: 0.81 MG/DL
EOSINOPHIL # BLD AUTO: 0.22 X10(3) UL (ref 0–0.7)
EOSINOPHIL NFR BLD AUTO: 2.7 %
ERYTHROCYTE [DISTWIDTH] IN BLOOD BY AUTOMATED COUNT: 13.5 %
GLOBULIN PLAS-MCNC: 3.3 G/DL (ref 2.8–4.4)
GLUCOSE BLD-MCNC: 93 MG/DL (ref 70–99)
GLUCOSE UR STRIP.AUTO-MCNC: NEGATIVE MG/DL
HCT VFR BLD AUTO: 46.6 %
HGB BLD-MCNC: 15.4 G/DL
IMM GRANULOCYTES # BLD AUTO: 0.02 X10(3) UL (ref 0–1)
IMM GRANULOCYTES NFR BLD: 0.2 %
KETONES UR STRIP.AUTO-MCNC: NEGATIVE MG/DL
LEUKOCYTE ESTERASE UR QL STRIP.AUTO: NEGATIVE
LYMPHOCYTES # BLD AUTO: 2.14 X10(3) UL (ref 1–4)
LYMPHOCYTES NFR BLD AUTO: 26.4 %
M PROTEIN MFR SERPL ELPH: 6.9 G/DL (ref 6.4–8.2)
MCH RBC QN AUTO: 30.3 PG (ref 26–34)
MCHC RBC AUTO-ENTMCNC: 33 G/DL (ref 31–37)
MCV RBC AUTO: 91.6 FL
MONOCYTES # BLD AUTO: 0.9 X10(3) UL (ref 0.1–1)
MONOCYTES NFR BLD AUTO: 11.1 %
NEUTROPHILS # BLD AUTO: 4.8 X10 (3) UL (ref 1.5–7.7)
NEUTROPHILS # BLD AUTO: 4.8 X10(3) UL (ref 1.5–7.7)
NEUTROPHILS NFR BLD AUTO: 59.2 %
NITRITE UR QL STRIP.AUTO: NEGATIVE
OSMOLALITY SERPL CALC.SUM OF ELEC: 292 MOSM/KG (ref 275–295)
PH UR STRIP.AUTO: 6 [PH] (ref 5–8)
PLATELET # BLD AUTO: 213 10(3)UL (ref 150–450)
POTASSIUM SERPL-SCNC: 3.8 MMOL/L (ref 3.5–5.1)
PROT UR STRIP.AUTO-MCNC: NEGATIVE MG/DL
RBC # BLD AUTO: 5.09 X10(6)UL
RBC UR QL AUTO: NEGATIVE
SODIUM SERPL-SCNC: 140 MMOL/L (ref 136–145)
SP GR UR STRIP.AUTO: 1.01 (ref 1–1.03)
UROBILINOGEN UR STRIP.AUTO-MCNC: <2 MG/DL
WBC # BLD AUTO: 8.1 X10(3) UL (ref 4–11)

## 2021-08-11 PROCEDURE — 99285 EMERGENCY DEPT VISIT HI MDM: CPT

## 2021-08-11 PROCEDURE — 93971 EXTREMITY STUDY: CPT | Performed by: EMERGENCY MEDICINE

## 2021-08-11 PROCEDURE — 96360 HYDRATION IV INFUSION INIT: CPT

## 2021-08-11 PROCEDURE — 81001 URINALYSIS AUTO W/SCOPE: CPT | Performed by: EMERGENCY MEDICINE

## 2021-08-11 PROCEDURE — 99284 EMERGENCY DEPT VISIT MOD MDM: CPT

## 2021-08-11 PROCEDURE — 74176 CT ABD & PELVIS W/O CONTRAST: CPT | Performed by: EMERGENCY MEDICINE

## 2021-08-11 PROCEDURE — 80053 COMPREHEN METABOLIC PANEL: CPT | Performed by: EMERGENCY MEDICINE

## 2021-08-11 PROCEDURE — 85025 COMPLETE CBC W/AUTO DIFF WBC: CPT | Performed by: EMERGENCY MEDICINE

## 2021-08-11 PROCEDURE — 96361 HYDRATE IV INFUSION ADD-ON: CPT

## 2021-08-11 RX ORDER — ORPHENADRINE CITRATE 100 MG/1
100 TABLET, EXTENDED RELEASE ORAL 2 TIMES DAILY PRN
Qty: 10 TABLET | Refills: 0 | Status: SHIPPED | OUTPATIENT
Start: 2021-08-11

## 2021-08-11 RX ORDER — NAPROXEN 500 MG/1
500 TABLET ORAL 2 TIMES DAILY WITH MEALS
Qty: 20 TABLET | Refills: 0 | Status: SHIPPED | OUTPATIENT
Start: 2021-08-11

## 2021-08-11 RX ORDER — SODIUM CHLORIDE 9 MG/ML
INJECTION, SOLUTION INTRAVENOUS CONTINUOUS
Status: DISCONTINUED | OUTPATIENT
Start: 2021-08-11 | End: 2021-08-11

## 2021-08-11 NOTE — ED PROVIDER NOTES
Patient Seen in: [facility] Emergency Department      History   Patient presents with:  Back Pain    Stated Complaint: back pain    HPI/Subjective:   HPI    Patient with a history of high blood pressure, thyroid disease, reflux, and prostate cancer Used    Vaping Use      Vaping Use: Never used    Alcohol use: Yes      Alcohol/week: 0.0 standard drinks      Comment: social    Drug use: No             Review of Systems    Positive for stated complaint: back pain  Other systems are as noted in HPI.   Co Final result                 Please view results for these tests on the individual orders. CBC W/ DIFFERENTIAL          Lumbar spine:  CONCLUSION:       1.  Moderate to severe central canal stenosis from L2-3 to L4-5, most severe at L4 taken with food. Norflex muscle relaxant may help. I cautioned patient on its sedating side effects. I recommend close follow-up with his primary care doctor.   Radiologist recommends further imaging of the right kidney lesion and I discussed with with t

## 2021-08-11 NOTE — ED INITIAL ASSESSMENT (HPI)
PT here due to lower left back pain. PT stated that starting last Thursday he was getting left flank pain. PT stated that when he was trying to get up and also walking is causing pain.  Pt stated that he has noticed that there was some swelling to his left

## 2021-09-14 ENCOUNTER — OFFICE VISIT (OUTPATIENT)
Dept: SURGERY | Facility: CLINIC | Age: 81
End: 2021-09-14
Payer: MEDICARE

## 2021-09-14 DIAGNOSIS — R82.90 URINE FINDING: Primary | ICD-10-CM

## 2021-09-14 DIAGNOSIS — N39.3 STRESS INCONTINENCE, MALE: ICD-10-CM

## 2021-09-14 DIAGNOSIS — N28.89 RENAL MASS, RIGHT: ICD-10-CM

## 2021-09-14 DIAGNOSIS — Z85.46 HISTORY OF PROSTATE CANCER: ICD-10-CM

## 2021-09-14 LAB
APPEARANCE: CLEAR
BILIRUBIN: NEGATIVE
GLUCOSE (URINE DIPSTICK): NEGATIVE MG/DL
LEUKOCYTES: NEGATIVE
MULTISTIX LOT#: NORMAL NUMERIC
NITRITE, URINE: NEGATIVE
OCCULT BLOOD: NEGATIVE
PH, URINE: 5.5 (ref 4.5–8)
PROTEIN (URINE DIPSTICK): NEGATIVE MG/DL
SPECIFIC GRAVITY: >=1.03 (ref 1–1.03)
URINE-COLOR: YELLOW
UROBILINOGEN,SEMI-QN: 1 MG/DL (ref 0–1.9)

## 2021-09-14 PROCEDURE — 99203 OFFICE O/P NEW LOW 30 MIN: CPT | Performed by: UROLOGY

## 2021-09-14 PROCEDURE — 81003 URINALYSIS AUTO W/O SCOPE: CPT | Performed by: UROLOGY

## 2021-09-14 NOTE — PROGRESS NOTES
Rooming Clinician:     Fito Garg is a 80year old male. Patient presents with:  Consult: follow from ER for left back pain. Not in pain now. testing showed possible kidney stones  Prostate Cancer: h/o prostate cancer. prostatectomy 2002. radiation.  las BOWEL/MESENTERY:  No visible mass, obstruction, or bowel wall thickening.  Normal appendix. ABDOMINAL WALL:  Stable mesh in bulging lower abdominal wall.  Stable small fat containing umbilical hernia and right inguinal hernia.    BONES: Horald Single 40 MG Oral Capsule Delayed Release TAKE 1 CAPSULE BY MOUTH 30 MINUTES BEFORE BREAKFAST AND 30 MINUTES BEFORE DINNER AS DIRECTED 180 capsule 0   • LEVOTHYROXINE SODIUM 50 MCG Oral Tab TAKE 1 TABLET(50 MCG) BY MOUTH EVERY MORNING BEFORE BREAKFAST 90 tablet 1 Vaping Use      Vaping Use: Never used    Alcohol use:  Yes      Alcohol/week: 0.0 standard drinks      Comment: social    Drug use: No       REVIEW OF SYSTEMS:     GENERAL HEALTH: feels well otherwise  SKIN: denies any unusual skin lesions or rashes  RESPI or has increased vascularity is thought to represent a malignancy and will require treatment depending on the size of the mass and the patient's and medical condition.       Diagnoses and all orders for this visit:    Urine finding  -     URINALYSIS, AUTO,

## 2021-09-24 ENCOUNTER — HOSPITAL ENCOUNTER (OUTPATIENT)
Dept: CT IMAGING | Facility: HOSPITAL | Age: 81
Discharge: HOME | End: 2021-09-24
Attending: UROLOGY
Payer: MEDICARE

## 2021-09-24 DIAGNOSIS — N28.89 RENAL MASS, RIGHT: ICD-10-CM

## 2021-09-24 LAB — CREAT BLD-MCNC: 0.8 MG/DL

## 2021-09-24 PROCEDURE — 82565 ASSAY OF CREATININE: CPT

## 2021-09-24 PROCEDURE — 74170 CT ABD WO CNTRST FLWD CNTRST: CPT | Performed by: UROLOGY

## 2021-10-07 ENCOUNTER — OFFICE VISIT (OUTPATIENT)
Dept: SURGERY | Facility: CLINIC | Age: 81
End: 2021-10-07
Payer: MEDICARE

## 2021-10-07 DIAGNOSIS — Z85.46 HISTORY OF PROSTATE CANCER: ICD-10-CM

## 2021-10-07 DIAGNOSIS — N28.89 RENAL MASS, RIGHT: Primary | ICD-10-CM

## 2021-10-07 PROCEDURE — 99213 OFFICE O/P EST LOW 20 MIN: CPT | Performed by: UROLOGY

## 2021-10-07 NOTE — PROGRESS NOTES
Rooming Clinician:     Naomy Vines is a 80year old male. No chief complaint on file. HPI:     Patient returns with his spouse to review recent contrast CT scan for further evaluation of his asymptomatic 2.2 cm right renal mass.     Current Outpati SUBMUCOSA INJECTION(S), ANY SUBSTANCE  2016   • EYE SURGERY Right 09/2020    GLAUCOMA   • LAPAROSCOPY, SURGICAL PROSTATECTOMY, RETROPUBIC RADICAL, W/NERVE SPARING  02/2002      Social History:  Social History    Tobacco Use      Smoking status: Light Tobac material.  Dose reduction techniques were used. Dose information is transmitted to the ACR [facility] [employer]) NRDR ([address]) which includes the Dose Index Registry.   PATIENT STATED HISTORY:(As transcribed by [employer] management include active surveillance, total or partial nephrectomy open or laparoscopic or robotic as well as radiofrequency ablation.   I discussed risks, benefits and complications of these procedures with the patient and he understands and wishes to re

## 2021-10-11 RX ORDER — OMEPRAZOLE 40 MG/1
CAPSULE, DELAYED RELEASE ORAL
Qty: 180 CAPSULE | Refills: 0 | Status: SHIPPED | OUTPATIENT
Start: 2021-10-11

## 2021-10-11 NOTE — TELEPHONE ENCOUNTER
Medication(s) to Refill:   Requested Prescriptions     Pending Prescriptions Disp Refills   • OMEPRAZOLE 40 MG Oral Capsule Delayed Release [Pharmacy Med Name: OMEPRAZOLE 40MG CAPSULES] 180 capsule 0     Sig: TAKE 1 CAPSULE BY MOUTH 30 MINUTES BEFORE BREAK

## 2021-11-11 ENCOUNTER — OFFICE VISIT (OUTPATIENT)
Dept: SURGERY | Facility: CLINIC | Age: 81
End: 2021-11-11
Payer: MEDICARE

## 2021-11-11 DIAGNOSIS — Z86.69 HISTORY OF SLEEP APNEA: ICD-10-CM

## 2021-11-11 DIAGNOSIS — R82.90 URINE FINDING: Primary | ICD-10-CM

## 2021-11-11 DIAGNOSIS — R35.1 NOCTURIA: ICD-10-CM

## 2021-11-11 DIAGNOSIS — R32 URINARY INCONTINENCE, UNSPECIFIED TYPE: ICD-10-CM

## 2021-11-11 DIAGNOSIS — N28.89 RENAL MASS, RIGHT: ICD-10-CM

## 2021-11-11 PROCEDURE — 99214 OFFICE O/P EST MOD 30 MIN: CPT | Performed by: UROLOGY

## 2021-11-11 PROCEDURE — 81003 URINALYSIS AUTO W/O SCOPE: CPT | Performed by: UROLOGY

## 2021-11-11 NOTE — PROGRESS NOTES
Rooming Clinician:     Grace Suarez is a 80year old male. Patient presents with:   Follow - Up: c/o Renal Mass        HPI:     Patient returns with his spouse for follow-up examination regarding a clinically asymptomatic and solid small 2.5 cm right renal Oral Tab Take 1 tablet by mouth. • Multiple Vitamins-Minerals (CENTRUM SILVER) Oral Tab Take 1 tablet by mouth daily. • Atorvastatin Calcium (LIPITOR) 10 MG Oral Tab Take 10 mg by mouth daily.      • ASPIRIN 81 MG OR TABS daily     • COQ10 30 MG OR edema    LAB:     Lab Results   Component Value Date    WBC 8.1 08/11/2021    HGB 15.4 08/11/2021    HCT 46.6 08/11/2021    .0 08/11/2021    CREATSERUM 0.81 08/11/2021    BUN 18 08/11/2021     08/11/2021    K 3.8 08/11/2021    PSA 0.02 10/18/2 aorta.   BONES:  Degenerative changes of the lumbar spine.                        Impression   CONCLUSION:     1. Enhancing 2.5 cm right renal mass worrisome for neoplasm.     2.  Cholelithiasis.  Possible mild gallbladder wall thickening.  If further evalu spent 30 minutes with the patient and or spouse or family reviewing the patient's condition, including laboratory data, imaging, or pathology reports,  formulating a plan and making recommendations spending at least 50% of the time in face to face discussi

## 2021-11-29 DIAGNOSIS — E03.9 ACQUIRED HYPOTHYROIDISM: ICD-10-CM

## 2021-11-30 RX ORDER — LEVOTHYROXINE SODIUM 0.05 MG/1
TABLET ORAL
Qty: 90 TABLET | Refills: 1 | Status: SHIPPED | OUTPATIENT
Start: 2021-11-30

## 2021-11-30 NOTE — TELEPHONE ENCOUNTER
Medication(s) to Refill:   Requested Prescriptions     Pending Prescriptions Disp Refills   • LEVOTHYROXINE 50 MCG Oral Tab [Pharmacy Med Name: LEVOTHYROXINE 0.05MG (50MCG) TAB] 90 tablet 1     Sig: TAKE 1 TABLET(50 MCG) BY MOUTH EVERY MORNING BEFORE BREAK

## 2021-12-13 ENCOUNTER — OFFICE VISIT (OUTPATIENT)
Dept: SURGERY | Facility: CLINIC | Age: 81
End: 2021-12-13
Payer: MEDICARE

## 2021-12-13 DIAGNOSIS — Z86.69 HISTORY OF SLEEP APNEA: Primary | ICD-10-CM

## 2021-12-13 DIAGNOSIS — Z85.46 HISTORY OF PROSTATE CANCER: ICD-10-CM

## 2021-12-13 DIAGNOSIS — N28.89 RENAL MASS, RIGHT: ICD-10-CM

## 2021-12-13 PROCEDURE — 99213 OFFICE O/P EST LOW 20 MIN: CPT | Performed by: UROLOGY

## 2021-12-13 RX ORDER — OXYBUTYNIN CHLORIDE 10 MG/1
10 TABLET, EXTENDED RELEASE ORAL DAILY
Qty: 90 TABLET | Refills: 3 | Status: SHIPPED | OUTPATIENT
Start: 2021-12-13

## 2021-12-13 NOTE — PROGRESS NOTES
Rooming Clinician:     Naomy Vines is a 80year old male. Patient presents with: Follow - Up: Here to review voiding diary        HPI:     Patient returns with his wife with a voiding diary. He had about 50 ounces input and 48 ounces output.   Maximum b 25-25 MG Oral Tab Take 1 tablet by mouth. • Multiple Vitamins-Minerals (CENTRUM SILVER) Oral Tab Take 1 tablet by mouth daily. • Atorvastatin Calcium (LIPITOR) 10 MG Oral Tab Take 10 mg by mouth daily.      • ASPIRIN 81 MG OR TABS daily     • COQ10 or edema    LAB:     Lab Results   Component Value Date    WBC 8.1 08/11/2021    HGB 15.4 08/11/2021    HCT 46.6 08/11/2021    .0 08/11/2021    CREATSERUM 0.81 08/11/2021    BUN 18 08/11/2021     08/11/2021    K 3.8 08/11/2021    PSA 0.02 10/1

## 2022-01-17 ENCOUNTER — TELEPHONE (OUTPATIENT)
Dept: FAMILY MEDICINE CLINIC | Facility: CLINIC | Age: 82
End: 2022-01-17

## 2022-01-17 DIAGNOSIS — Z12.5 SCREENING FOR PROSTATE CANCER: ICD-10-CM

## 2022-01-17 DIAGNOSIS — I10 HTN (HYPERTENSION), BENIGN: ICD-10-CM

## 2022-01-17 DIAGNOSIS — E03.9 ACQUIRED HYPOTHYROIDISM: Primary | ICD-10-CM

## 2022-01-19 ENCOUNTER — LAB ENCOUNTER (OUTPATIENT)
Dept: LAB | Age: 82
End: 2022-01-19
Attending: FAMILY MEDICINE
Payer: MEDICARE

## 2022-01-19 DIAGNOSIS — E03.9 ACQUIRED HYPOTHYROIDISM: ICD-10-CM

## 2022-01-19 DIAGNOSIS — I10 HTN (HYPERTENSION), BENIGN: ICD-10-CM

## 2022-01-19 LAB
ALBUMIN SERPL-MCNC: 3.4 G/DL (ref 3.4–5)
ALBUMIN/GLOB SERPL: 1.2 {RATIO} (ref 1–2)
ALP LIVER SERPL-CCNC: 69 U/L
ALT SERPL-CCNC: 27 U/L
ANION GAP SERPL CALC-SCNC: 8 MMOL/L (ref 0–18)
AST SERPL-CCNC: 18 U/L (ref 15–37)
BASOPHILS # BLD AUTO: 0.04 X10(3) UL (ref 0–0.2)
BASOPHILS NFR BLD AUTO: 0.6 %
BILIRUB SERPL-MCNC: 1.4 MG/DL (ref 0.1–2)
BUN BLD-MCNC: 18 MG/DL (ref 7–18)
CALCIUM BLD-MCNC: 9.5 MG/DL (ref 8.5–10.1)
CHLORIDE SERPL-SCNC: 105 MMOL/L (ref 98–112)
CHOLEST SERPL-MCNC: 141 MG/DL (ref ?–200)
CO2 SERPL-SCNC: 29 MMOL/L (ref 21–32)
CREAT BLD-MCNC: 0.71 MG/DL
EOSINOPHIL # BLD AUTO: 0.22 X10(3) UL (ref 0–0.7)
EOSINOPHIL NFR BLD AUTO: 3.1 %
ERYTHROCYTE [DISTWIDTH] IN BLOOD BY AUTOMATED COUNT: 13.7 %
FASTING PATIENT LIPID ANSWER: YES
FASTING STATUS PATIENT QL REPORTED: YES
GLOBULIN PLAS-MCNC: 2.9 G/DL (ref 2.8–4.4)
GLUCOSE BLD-MCNC: 107 MG/DL (ref 70–99)
HCT VFR BLD AUTO: 47.9 %
HDLC SERPL-MCNC: 49 MG/DL (ref 40–59)
HGB BLD-MCNC: 15.6 G/DL
IMM GRANULOCYTES # BLD AUTO: 0.01 X10(3) UL (ref 0–1)
IMM GRANULOCYTES NFR BLD: 0.1 %
LDLC SERPL CALC-MCNC: 71 MG/DL (ref ?–100)
LYMPHOCYTES # BLD AUTO: 2.03 X10(3) UL (ref 1–4)
LYMPHOCYTES NFR BLD AUTO: 28.9 %
MCH RBC QN AUTO: 30.2 PG (ref 26–34)
MCHC RBC AUTO-ENTMCNC: 32.6 G/DL (ref 31–37)
MCV RBC AUTO: 92.8 FL
MONOCYTES # BLD AUTO: 0.62 X10(3) UL (ref 0.1–1)
MONOCYTES NFR BLD AUTO: 8.8 %
NEUTROPHILS # BLD AUTO: 4.11 X10 (3) UL (ref 1.5–7.7)
NEUTROPHILS # BLD AUTO: 4.11 X10(3) UL (ref 1.5–7.7)
NEUTROPHILS NFR BLD AUTO: 58.5 %
NONHDLC SERPL-MCNC: 92 MG/DL (ref ?–130)
OSMOLALITY SERPL CALC.SUM OF ELEC: 296 MOSM/KG (ref 275–295)
PLATELET # BLD AUTO: 205 10(3)UL (ref 150–450)
POTASSIUM SERPL-SCNC: 4 MMOL/L (ref 3.5–5.1)
PROT SERPL-MCNC: 6.3 G/DL (ref 6.4–8.2)
RBC # BLD AUTO: 5.16 X10(6)UL
SODIUM SERPL-SCNC: 142 MMOL/L (ref 136–145)
TRIGL SERPL-MCNC: 118 MG/DL (ref 30–149)
TSI SER-ACNC: 1.3 MIU/ML (ref 0.36–3.74)
VLDLC SERPL CALC-MCNC: 18 MG/DL (ref 0–30)
WBC # BLD AUTO: 7 X10(3) UL (ref 4–11)

## 2022-01-19 PROCEDURE — 36415 COLL VENOUS BLD VENIPUNCTURE: CPT

## 2022-01-19 PROCEDURE — 80053 COMPREHEN METABOLIC PANEL: CPT

## 2022-01-19 PROCEDURE — 80061 LIPID PANEL: CPT

## 2022-01-19 PROCEDURE — 85025 COMPLETE CBC W/AUTO DIFF WBC: CPT

## 2022-01-19 PROCEDURE — 84443 ASSAY THYROID STIM HORMONE: CPT

## 2022-01-20 ENCOUNTER — TELEPHONE (OUTPATIENT)
Dept: FAMILY MEDICINE CLINIC | Facility: CLINIC | Age: 82
End: 2022-01-20

## 2022-02-09 ENCOUNTER — OFFICE VISIT (OUTPATIENT)
Dept: FAMILY MEDICINE CLINIC | Facility: CLINIC | Age: 82
End: 2022-02-09
Payer: MEDICARE

## 2022-02-09 VITALS
HEIGHT: 68 IN | OXYGEN SATURATION: 97 % | HEART RATE: 64 BPM | DIASTOLIC BLOOD PRESSURE: 72 MMHG | SYSTOLIC BLOOD PRESSURE: 142 MMHG | BODY MASS INDEX: 33.34 KG/M2 | RESPIRATION RATE: 16 BRPM | WEIGHT: 220 LBS

## 2022-02-09 DIAGNOSIS — I10 HTN (HYPERTENSION), BENIGN: ICD-10-CM

## 2022-02-09 DIAGNOSIS — K21.9 GASTROESOPHAGEAL REFLUX DISEASE, UNSPECIFIED WHETHER ESOPHAGITIS PRESENT: ICD-10-CM

## 2022-02-09 DIAGNOSIS — E03.9 ACQUIRED HYPOTHYROIDISM: ICD-10-CM

## 2022-02-09 DIAGNOSIS — Z85.46 HISTORY OF PROSTATE CANCER: ICD-10-CM

## 2022-02-09 DIAGNOSIS — Z00.00 WELLNESS EXAMINATION: Primary | ICD-10-CM

## 2022-02-09 PROCEDURE — G0439 PPPS, SUBSEQ VISIT: HCPCS | Performed by: FAMILY MEDICINE

## 2022-02-09 RX ORDER — OMEPRAZOLE 40 MG/1
40 CAPSULE, DELAYED RELEASE ORAL DAILY
Qty: 90 CAPSULE | Refills: 3 | Status: SHIPPED | OUTPATIENT
Start: 2022-02-09

## 2022-03-30 ENCOUNTER — TELEPHONE (OUTPATIENT)
Dept: SURGERY | Facility: CLINIC | Age: 82
End: 2022-03-30

## 2022-03-30 NOTE — TELEPHONE ENCOUNTER
Per wife pt was instructed to have a ct of abdomen and pelvis and needs the order put in. Per wife can someone please call when she can schedule.  Please advise

## 2022-04-12 ENCOUNTER — HOSPITAL ENCOUNTER (OUTPATIENT)
Dept: ULTRASOUND IMAGING | Age: 82
Discharge: HOME | End: 2022-04-12
Attending: UROLOGY
Payer: MEDICARE

## 2022-04-12 DIAGNOSIS — N28.89 RENAL MASS, RIGHT: ICD-10-CM

## 2022-04-12 PROCEDURE — 76775 US EXAM ABDO BACK WALL LIM: CPT | Performed by: UROLOGY

## 2022-04-12 NOTE — PROGRESS NOTES
Your recent renal ultrasound shows that the left renal mass is stable. Recommend follow up in the office as directed.     Sincerely,  Marie Small MD

## 2022-04-13 ENCOUNTER — OFFICE VISIT (OUTPATIENT)
Dept: FAMILY MEDICINE CLINIC | Facility: CLINIC | Age: 82
End: 2022-04-13
Payer: MEDICARE

## 2022-04-13 VITALS
WEIGHT: 224 LBS | BODY MASS INDEX: 33.95 KG/M2 | HEIGHT: 68 IN | TEMPERATURE: 99 F | HEART RATE: 64 BPM | SYSTOLIC BLOOD PRESSURE: 132 MMHG | RESPIRATION RATE: 14 BRPM | OXYGEN SATURATION: 97 % | DIASTOLIC BLOOD PRESSURE: 64 MMHG

## 2022-04-13 DIAGNOSIS — R05.9 COUGH: Primary | ICD-10-CM

## 2022-04-13 DIAGNOSIS — R09.89 RUNNY NOSE: ICD-10-CM

## 2022-04-13 PROCEDURE — 99213 OFFICE O/P EST LOW 20 MIN: CPT | Performed by: FAMILY MEDICINE

## 2022-04-13 RX ORDER — METHYLPREDNISOLONE 4 MG/1
TABLET ORAL
Qty: 1 EACH | Refills: 0 | Status: SHIPPED | OUTPATIENT
Start: 2022-04-13

## 2022-04-13 RX ORDER — BENZONATATE 200 MG/1
200 CAPSULE ORAL 3 TIMES DAILY PRN
Qty: 20 CAPSULE | Refills: 0 | Status: SHIPPED | OUTPATIENT
Start: 2022-04-13

## 2022-04-14 LAB — SARS-COV-2 RNA RESP QL NAA+PROBE: NOT DETECTED

## 2022-04-19 ENCOUNTER — TELEPHONE (OUTPATIENT)
Dept: FAMILY MEDICINE CLINIC | Facility: CLINIC | Age: 82
End: 2022-04-19

## 2022-04-19 RX ORDER — AZITHROMYCIN 250 MG/1
TABLET, FILM COATED ORAL
Qty: 6 TABLET | Refills: 0 | Status: SHIPPED | OUTPATIENT
Start: 2022-04-19 | End: 2022-04-24

## 2022-04-19 NOTE — TELEPHONE ENCOUNTER
Pt seen in [facility] on 4/13 for cough & given Rx for Medrol & Tessalon. Neg COVID. Pt continues to have cough without improvement. Wife is asking what else can be done. No available appts today. LOV  2/9 Please advise on any orders for pt.

## 2022-04-19 NOTE — TELEPHONE ENCOUNTER
Left message and informed him to take medication and if not feeling better by Friday, to call us back and we will order a CXR.

## 2022-04-19 NOTE — TELEPHONE ENCOUNTER
Pt was seen at [facility]  04/13 and still has a bad cough and  No better. Spouse wants to know what they should do?

## 2022-05-05 ENCOUNTER — OFFICE VISIT (OUTPATIENT)
Dept: SURGERY | Facility: CLINIC | Age: 82
End: 2022-05-05
Payer: MEDICARE

## 2022-05-05 DIAGNOSIS — N28.89 RENAL MASS, RIGHT: ICD-10-CM

## 2022-05-05 DIAGNOSIS — Z86.69 HISTORY OF SLEEP APNEA: ICD-10-CM

## 2022-05-05 DIAGNOSIS — Z85.46 HISTORY OF PROSTATE CANCER: ICD-10-CM

## 2022-05-05 DIAGNOSIS — R82.90 URINE FINDING: Primary | ICD-10-CM

## 2022-05-05 DIAGNOSIS — N39.3 STRESS INCONTINENCE, MALE: ICD-10-CM

## 2022-05-05 DIAGNOSIS — R32 URINARY INCONTINENCE, UNSPECIFIED TYPE: ICD-10-CM

## 2022-05-05 LAB
APPEARANCE: CLEAR
BILIRUBIN: NEGATIVE
GLUCOSE (URINE DIPSTICK): NEGATIVE MG/DL
KETONES (URINE DIPSTICK): 15 MG/DL
LEUKOCYTES: NEGATIVE
MULTISTIX LOT#: ABNORMAL NUMERIC
NITRITE, URINE: NEGATIVE
OCCULT BLOOD: NEGATIVE
PH, URINE: 5.5 (ref 4.5–8)
PROTEIN (URINE DIPSTICK): 30 MG/DL
SPECIFIC GRAVITY: >=1.03 (ref 1–1.03)
URINE-COLOR: YELLOW
UROBILINOGEN,SEMI-QN: 1 MG/DL (ref 0–1.9)

## 2022-05-05 PROCEDURE — 81003 URINALYSIS AUTO W/O SCOPE: CPT | Performed by: UROLOGY

## 2022-05-05 PROCEDURE — 99213 OFFICE O/P EST LOW 20 MIN: CPT | Performed by: UROLOGY

## 2022-06-07 ENCOUNTER — TELEPHONE (OUTPATIENT)
Dept: FAMILY MEDICINE CLINIC | Facility: CLINIC | Age: 82
End: 2022-06-07

## 2022-06-07 NOTE — TELEPHONE ENCOUNTER
Pt dropping off handicap placard for completion, pt left envelope and stamp for paperwork to mailed once completed.  Copy at front, original placed in incoming folder for provider

## 2022-06-22 ENCOUNTER — HOSPITAL ENCOUNTER (EMERGENCY)
Facility: HOSPITAL | Age: 82
Discharge: HOME | End: 2022-06-23
Attending: EMERGENCY MEDICINE
Payer: MEDICARE

## 2022-06-22 ENCOUNTER — APPOINTMENT (OUTPATIENT)
Dept: GENERAL RADIOLOGY | Facility: HOSPITAL | Age: 82
End: 2022-06-22
Attending: EMERGENCY MEDICINE
Payer: MEDICARE

## 2022-06-22 VITALS
HEART RATE: 65 BPM | TEMPERATURE: 98 F | RESPIRATION RATE: 18 BRPM | OXYGEN SATURATION: 98 % | DIASTOLIC BLOOD PRESSURE: 76 MMHG | SYSTOLIC BLOOD PRESSURE: 132 MMHG

## 2022-06-22 DIAGNOSIS — M54.50 ACUTE RIGHT-SIDED LOW BACK PAIN WITHOUT SCIATICA: Primary | ICD-10-CM

## 2022-06-22 PROCEDURE — 99283 EMERGENCY DEPT VISIT LOW MDM: CPT

## 2022-06-22 PROCEDURE — 73502 X-RAY EXAM HIP UNI 2-3 VIEWS: CPT | Performed by: EMERGENCY MEDICINE

## 2022-06-22 PROCEDURE — 72110 X-RAY EXAM L-2 SPINE 4/>VWS: CPT | Performed by: EMERGENCY MEDICINE

## 2022-06-22 RX ORDER — ACETAMINOPHEN AND CODEINE PHOSPHATE 300; 30 MG/1; MG/1
1 TABLET ORAL ONCE
Status: COMPLETED | OUTPATIENT
Start: 2022-06-22 | End: 2022-06-22

## 2022-06-22 RX ORDER — DOCUSATE SODIUM 100 MG/1
100 CAPSULE, LIQUID FILLED ORAL 2 TIMES DAILY
Qty: 20 CAPSULE | Refills: 0 | Status: SHIPPED | OUTPATIENT
Start: 2022-06-22 | End: 2022-07-02

## 2022-06-22 RX ORDER — ACETAMINOPHEN AND CODEINE PHOSPHATE 300; 30 MG/1; MG/1
1-2 TABLET ORAL EVERY 6 HOURS PRN
Qty: 10 TABLET | Refills: 0 | Status: SHIPPED | OUTPATIENT
Start: 2022-06-22 | End: 2022-06-27

## 2022-06-23 NOTE — ED INITIAL ASSESSMENT (HPI)
Pt arrived to ED with c/o R hip pain. Pt sts he has hx of arthritis and did a lot of walking today and when he came home and went to bend down , he felt his hip pop out then when he stood up, it popped back in. Pt sts he has no pain now, but rated it 8/10. Pt is aox4.

## 2022-08-17 ENCOUNTER — MED REC SCAN ONLY (OUTPATIENT)
Dept: FAMILY MEDICINE CLINIC | Facility: CLINIC | Age: 82
End: 2022-08-17

## 2022-08-26 DIAGNOSIS — E03.9 ACQUIRED HYPOTHYROIDISM: ICD-10-CM

## 2022-08-26 RX ORDER — LEVOTHYROXINE SODIUM 0.05 MG/1
TABLET ORAL
Qty: 90 TABLET | Refills: 1 | Status: SHIPPED | OUTPATIENT
Start: 2022-08-26

## 2022-10-13 ENCOUNTER — HOSPITAL ENCOUNTER (OUTPATIENT)
Dept: CT IMAGING | Age: 82
Discharge: HOME | End: 2022-10-13
Attending: UROLOGY
Payer: MEDICARE

## 2022-10-13 DIAGNOSIS — N28.89 RENAL MASS, RIGHT: ICD-10-CM

## 2022-10-13 PROCEDURE — 74170 CT ABD WO CNTRST FLWD CNTRST: CPT | Performed by: UROLOGY

## 2022-10-13 PROCEDURE — 82565 ASSAY OF CREATININE: CPT

## 2022-10-14 LAB
CREAT BLD-MCNC: 0.7 MG/DL
GFR SERPLBLD BASED ON 1.73 SQ M-ARVRAT: 93 ML/MIN/1.73M2 (ref 60–?)

## 2022-11-07 ENCOUNTER — HOSPITAL ENCOUNTER (EMERGENCY)
Age: 82
Discharge: HOME | End: 2022-11-07
Attending: EMERGENCY MEDICINE
Payer: MEDICARE

## 2022-11-07 VITALS
WEIGHT: 200 LBS | BODY MASS INDEX: 30.31 KG/M2 | DIASTOLIC BLOOD PRESSURE: 84 MMHG | OXYGEN SATURATION: 98 % | RESPIRATION RATE: 18 BRPM | HEART RATE: 68 BPM | TEMPERATURE: 98 F | SYSTOLIC BLOOD PRESSURE: 149 MMHG | HEIGHT: 68 IN

## 2022-11-07 DIAGNOSIS — B34.9 VIRAL SYNDROME: Primary | ICD-10-CM

## 2022-11-07 LAB
POCT INFLUENZA A: NEGATIVE
POCT INFLUENZA B: NEGATIVE
SARS-COV-2 RNA RESP QL NAA+PROBE: NOT DETECTED

## 2022-11-07 PROCEDURE — 99283 EMERGENCY DEPT VISIT LOW MDM: CPT

## 2022-11-07 PROCEDURE — 87430 STREP A AG IA: CPT | Performed by: EMERGENCY MEDICINE

## 2022-11-07 PROCEDURE — 87502 INFLUENZA DNA AMP PROBE: CPT | Performed by: EMERGENCY MEDICINE

## 2022-11-07 RX ORDER — FLUTICASONE PROPIONATE 50 MCG
2 SPRAY, SUSPENSION (ML) NASAL DAILY
Qty: 16 G | Refills: 0 | Status: SHIPPED | OUTPATIENT
Start: 2022-11-07 | End: 2022-12-07

## 2022-11-07 RX ORDER — BENZONATATE 100 MG/1
100 CAPSULE ORAL 3 TIMES DAILY PRN
Qty: 30 CAPSULE | Refills: 0 | Status: SHIPPED | OUTPATIENT
Start: 2022-11-07 | End: 2022-12-07

## 2022-12-01 ENCOUNTER — OFFICE VISIT (OUTPATIENT)
Dept: SURGERY | Facility: CLINIC | Age: 82
End: 2022-12-01
Payer: MEDICARE

## 2022-12-01 DIAGNOSIS — Z85.46 HISTORY OF PROSTATE CANCER: ICD-10-CM

## 2022-12-01 DIAGNOSIS — R35.1 NOCTURIA: ICD-10-CM

## 2022-12-01 DIAGNOSIS — N39.3 STRESS INCONTINENCE, MALE: ICD-10-CM

## 2022-12-01 DIAGNOSIS — N28.89 RENAL MASS, RIGHT: Primary | ICD-10-CM

## 2022-12-01 DIAGNOSIS — Z86.69 HISTORY OF SLEEP APNEA: ICD-10-CM

## 2022-12-01 PROCEDURE — 99213 OFFICE O/P EST LOW 20 MIN: CPT | Performed by: UROLOGY

## 2022-12-01 NOTE — PATIENT INSTRUCTIONS
On behalf of myself and care team, I would like to thank you for entrusting us with your care today. It is our goal to provide you and your family with excellent care. Please note that you may receive a survey in the mail; any feedback you have for this is important to our team.  Once again, thank you and we hope to see you again in the future.        Herve zepeda from [employer]

## 2023-01-25 DIAGNOSIS — K21.9 GASTROESOPHAGEAL REFLUX DISEASE, UNSPECIFIED WHETHER ESOPHAGITIS PRESENT: ICD-10-CM

## 2023-01-25 DIAGNOSIS — E03.9 ACQUIRED HYPOTHYROIDISM: ICD-10-CM

## 2023-01-26 RX ORDER — LEVOTHYROXINE SODIUM 0.05 MG/1
TABLET ORAL
Qty: 90 TABLET | Refills: 1 | Status: SHIPPED | OUTPATIENT
Start: 2023-01-26

## 2023-01-26 RX ORDER — OMEPRAZOLE 40 MG/1
CAPSULE, DELAYED RELEASE ORAL
Qty: 90 CAPSULE | Refills: 3 | Status: SHIPPED | OUTPATIENT
Start: 2023-01-26

## 2023-01-31 ENCOUNTER — LAB ENCOUNTER (OUTPATIENT)
Dept: LAB | Age: 83
End: 2023-01-31
Attending: FAMILY MEDICINE
Payer: MEDICARE

## 2023-01-31 DIAGNOSIS — I10 HTN (HYPERTENSION), BENIGN: ICD-10-CM

## 2023-01-31 DIAGNOSIS — E03.9 ACQUIRED HYPOTHYROIDISM: ICD-10-CM

## 2023-01-31 DIAGNOSIS — Z85.46 HISTORY OF PROSTATE CANCER: ICD-10-CM

## 2023-01-31 LAB
ALBUMIN SERPL-MCNC: 3.7 G/DL (ref 3.4–5)
ALBUMIN/GLOB SERPL: 1.4 {RATIO} (ref 1–2)
ALP LIVER SERPL-CCNC: 62 U/L
ALT SERPL-CCNC: 22 U/L
ANION GAP SERPL CALC-SCNC: 3 MMOL/L (ref 0–18)
AST SERPL-CCNC: 19 U/L (ref 15–37)
BASOPHILS # BLD AUTO: 0.03 X10(3) UL (ref 0–0.2)
BASOPHILS NFR BLD AUTO: 0.4 %
BILIRUB SERPL-MCNC: 1.4 MG/DL (ref 0.1–2)
BUN BLD-MCNC: 18 MG/DL (ref 7–18)
CALCIUM BLD-MCNC: 9.6 MG/DL (ref 8.5–10.1)
CHLORIDE SERPL-SCNC: 107 MMOL/L (ref 98–112)
CHOLEST SERPL-MCNC: 136 MG/DL (ref ?–200)
CO2 SERPL-SCNC: 32 MMOL/L (ref 21–32)
CREAT BLD-MCNC: 0.81 MG/DL
EOSINOPHIL # BLD AUTO: 0.23 X10(3) UL (ref 0–0.7)
EOSINOPHIL NFR BLD AUTO: 3.3 %
ERYTHROCYTE [DISTWIDTH] IN BLOOD BY AUTOMATED COUNT: 13.6 %
FASTING PATIENT LIPID ANSWER: YES
FASTING STATUS PATIENT QL REPORTED: YES
GFR SERPLBLD BASED ON 1.73 SQ M-ARVRAT: 89 ML/MIN/1.73M2 (ref 60–?)
GLOBULIN PLAS-MCNC: 2.7 G/DL (ref 2.8–4.4)
GLUCOSE BLD-MCNC: 111 MG/DL (ref 70–99)
HCT VFR BLD AUTO: 48.2 %
HDLC SERPL-MCNC: 55 MG/DL (ref 40–59)
HGB BLD-MCNC: 15.7 G/DL
IMM GRANULOCYTES # BLD AUTO: 0.02 X10(3) UL (ref 0–1)
IMM GRANULOCYTES NFR BLD: 0.3 %
LDLC SERPL CALC-MCNC: 62 MG/DL (ref ?–100)
LYMPHOCYTES # BLD AUTO: 1.66 X10(3) UL (ref 1–4)
LYMPHOCYTES NFR BLD AUTO: 23.7 %
MCH RBC QN AUTO: 30.8 PG (ref 26–34)
MCHC RBC AUTO-ENTMCNC: 32.6 G/DL (ref 31–37)
MCV RBC AUTO: 94.7 FL
MONOCYTES # BLD AUTO: 0.55 X10(3) UL (ref 0.1–1)
MONOCYTES NFR BLD AUTO: 7.9 %
NEUTROPHILS # BLD AUTO: 4.5 X10 (3) UL (ref 1.5–7.7)
NEUTROPHILS # BLD AUTO: 4.5 X10(3) UL (ref 1.5–7.7)
NEUTROPHILS NFR BLD AUTO: 64.4 %
NONHDLC SERPL-MCNC: 81 MG/DL (ref ?–130)
OSMOLALITY SERPL CALC.SUM OF ELEC: 297 MOSM/KG (ref 275–295)
PLATELET # BLD AUTO: 215 10(3)UL (ref 150–450)
POTASSIUM SERPL-SCNC: 4.1 MMOL/L (ref 3.5–5.1)
PROT SERPL-MCNC: 6.4 G/DL (ref 6.4–8.2)
PSA SERPL-MCNC: 0.02 NG/ML (ref ?–4)
RBC # BLD AUTO: 5.09 X10(6)UL
SODIUM SERPL-SCNC: 142 MMOL/L (ref 136–145)
TRIGL SERPL-MCNC: 103 MG/DL (ref 30–149)
TSI SER-ACNC: 1.57 MIU/ML (ref 0.36–3.74)
VLDLC SERPL CALC-MCNC: 15 MG/DL (ref 0–30)
WBC # BLD AUTO: 7 X10(3) UL (ref 4–11)

## 2023-01-31 PROCEDURE — 36415 COLL VENOUS BLD VENIPUNCTURE: CPT

## 2023-01-31 PROCEDURE — 84443 ASSAY THYROID STIM HORMONE: CPT

## 2023-01-31 PROCEDURE — 85025 COMPLETE CBC W/AUTO DIFF WBC: CPT

## 2023-01-31 PROCEDURE — 80061 LIPID PANEL: CPT

## 2023-01-31 PROCEDURE — 84153 ASSAY OF PSA TOTAL: CPT

## 2023-01-31 PROCEDURE — 80053 COMPREHEN METABOLIC PANEL: CPT

## 2023-02-02 ENCOUNTER — OFFICE VISIT (OUTPATIENT)
Dept: SURGERY | Facility: CLINIC | Age: 83
End: 2023-02-02

## 2023-02-02 DIAGNOSIS — N28.89 RENAL MASS: Primary | ICD-10-CM

## 2023-02-02 DIAGNOSIS — R82.90 URINE FINDING: ICD-10-CM

## 2023-02-02 LAB
APPEARANCE: CLEAR
BILIRUBIN: NEGATIVE
GLUCOSE (URINE DIPSTICK): NEGATIVE MG/DL
KETONES (URINE DIPSTICK): 15 MG/DL
LEUKOCYTES: NEGATIVE
MULTISTIX LOT#: ABNORMAL NUMERIC
NITRITE, URINE: NEGATIVE
OCCULT BLOOD: NEGATIVE
PH, URINE: 5.5 (ref 4.5–8)
PROTEIN (URINE DIPSTICK): NEGATIVE MG/DL
SPECIFIC GRAVITY: 1.02 (ref 1–1.03)
URINE-COLOR: YELLOW
UROBILINOGEN,SEMI-QN: 0.2 MG/DL (ref 0–1.9)

## 2023-02-02 PROCEDURE — 81003 URINALYSIS AUTO W/O SCOPE: CPT | Performed by: SURGERY

## 2023-02-02 PROCEDURE — 99214 OFFICE O/P EST MOD 30 MIN: CPT | Performed by: SURGERY

## 2023-02-08 ENCOUNTER — TELEPHONE (OUTPATIENT)
Dept: FAMILY MEDICINE CLINIC | Facility: CLINIC | Age: 83
End: 2023-02-08

## 2023-02-09 RX ORDER — CEPHRADINE 500 MG
200 CAPSULE ORAL DAILY
COMMUNITY

## 2023-02-09 RX ORDER — IBUPROFEN 200 MG
400 TABLET ORAL EVERY 8 HOURS PRN
COMMUNITY

## 2023-03-03 ENCOUNTER — OFFICE VISIT (OUTPATIENT)
Dept: FAMILY MEDICINE CLINIC | Facility: CLINIC | Age: 83
End: 2023-03-03
Payer: MEDICARE

## 2023-03-03 VITALS
OXYGEN SATURATION: 98 % | SYSTOLIC BLOOD PRESSURE: 104 MMHG | RESPIRATION RATE: 18 BRPM | DIASTOLIC BLOOD PRESSURE: 74 MMHG | WEIGHT: 189 LBS | HEART RATE: 56 BPM | BODY MASS INDEX: 28.64 KG/M2 | HEIGHT: 68 IN

## 2023-03-03 DIAGNOSIS — I10 HTN (HYPERTENSION), BENIGN: ICD-10-CM

## 2023-03-03 DIAGNOSIS — E03.9 ACQUIRED HYPOTHYROIDISM: ICD-10-CM

## 2023-03-03 DIAGNOSIS — R22.1 NECK MASS: ICD-10-CM

## 2023-03-03 DIAGNOSIS — Z00.00 MEDICARE ANNUAL WELLNESS VISIT, SUBSEQUENT: Primary | ICD-10-CM

## 2023-03-03 DIAGNOSIS — K43.6 VENTRAL HERNIA WITH OBSTRUCTION AND WITHOUT GANGRENE: ICD-10-CM

## 2023-03-03 DIAGNOSIS — Z90.79 STATUS POST PROSTATECTOMY: ICD-10-CM

## 2023-03-03 DIAGNOSIS — Z85.46 HISTORY OF PROSTATE CANCER: ICD-10-CM

## 2023-03-03 DIAGNOSIS — H40.1131 PRIMARY OPEN ANGLE GLAUCOMA (POAG) OF BOTH EYES, MILD STAGE: ICD-10-CM

## 2023-03-03 PROBLEM — N39.3 SUI (STRESS URINARY INCONTINENCE), MALE: Status: RESOLVED | Noted: 2020-01-22 | Resolved: 2023-03-03

## 2023-03-03 PROBLEM — E04.2 GOITER, NONTOXIC, MULTINODULAR: Status: RESOLVED | Noted: 2017-02-10 | Resolved: 2023-03-03

## 2023-03-03 PROBLEM — K43.9 VENTRAL HERNIA WITHOUT OBSTRUCTION OR GANGRENE: Status: RESOLVED | Noted: 2019-12-26 | Resolved: 2023-03-03

## 2023-03-03 NOTE — PATIENT INSTRUCTIONS
[facility]    Dr. Abida Julian    [address] [facility]  [address] [facility], [address]      [address], [address]  [facility]    [id number] [place] [address]

## 2023-03-09 ENCOUNTER — OFFICE VISIT (OUTPATIENT)
Facility: LOCATION | Age: 83
End: 2023-03-09
Payer: MEDICARE

## 2023-03-09 VITALS — HEART RATE: 59 BPM | TEMPERATURE: 97 F

## 2023-03-09 DIAGNOSIS — D17.0 LIPOMA OF NECK: Primary | ICD-10-CM

## 2023-03-09 PROCEDURE — 99213 OFFICE O/P EST LOW 20 MIN: CPT | Performed by: SURGERY

## 2023-04-13 ENCOUNTER — TELEPHONE (OUTPATIENT)
Facility: LOCATION | Age: 83
End: 2023-04-13

## 2023-04-25 ENCOUNTER — OFFICE VISIT (OUTPATIENT)
Dept: FAMILY MEDICINE CLINIC | Facility: CLINIC | Age: 83
End: 2023-04-25
Payer: MEDICARE

## 2023-04-25 ENCOUNTER — EKG ENCOUNTER (OUTPATIENT)
Dept: LAB | Age: 83
End: 2023-04-25
Attending: SURGERY
Payer: MEDICARE

## 2023-04-25 VITALS
SYSTOLIC BLOOD PRESSURE: 134 MMHG | HEART RATE: 64 BPM | OXYGEN SATURATION: 96 % | DIASTOLIC BLOOD PRESSURE: 70 MMHG | HEIGHT: 68 IN | WEIGHT: 207 LBS | BODY MASS INDEX: 31.37 KG/M2 | RESPIRATION RATE: 16 BRPM

## 2023-04-25 DIAGNOSIS — D17.0 LIPOMA OF NECK: ICD-10-CM

## 2023-04-25 DIAGNOSIS — Z01.818 PREOPERATIVE GENERAL PHYSICAL EXAMINATION: Primary | ICD-10-CM

## 2023-04-25 DIAGNOSIS — Z01.818 PRE-OP TESTING: ICD-10-CM

## 2023-04-25 LAB
ATRIAL RATE: 56 BPM
P AXIS: 25 DEGREES
P-R INTERVAL: 240 MS
Q-T INTERVAL: 434 MS
QRS DURATION: 96 MS
QTC CALCULATION (BEZET): 418 MS
R AXIS: 1 DEGREES
T AXIS: 28 DEGREES
VENTRICULAR RATE: 56 BPM

## 2023-04-25 PROCEDURE — 99214 OFFICE O/P EST MOD 30 MIN: CPT | Performed by: NURSE PRACTITIONER

## 2023-04-25 PROCEDURE — 93005 ELECTROCARDIOGRAM TRACING: CPT

## 2023-04-25 PROCEDURE — 93010 ELECTROCARDIOGRAM REPORT: CPT | Performed by: INTERNAL MEDICINE

## 2023-04-26 ENCOUNTER — LAB ENCOUNTER (OUTPATIENT)
Dept: LAB | Age: 83
End: 2023-04-26
Attending: NURSE PRACTITIONER
Payer: MEDICARE

## 2023-04-26 DIAGNOSIS — Z01.818 PRE-OP TESTING: ICD-10-CM

## 2023-04-26 LAB
ALBUMIN SERPL-MCNC: 3.4 G/DL (ref 3.4–5)
ALBUMIN/GLOB SERPL: 1.1 {RATIO} (ref 1–2)
ALP LIVER SERPL-CCNC: 67 U/L
ALT SERPL-CCNC: 28 U/L
ANION GAP SERPL CALC-SCNC: 4 MMOL/L (ref 0–18)
AST SERPL-CCNC: 18 U/L (ref 15–37)
BILIRUB SERPL-MCNC: 1.2 MG/DL (ref 0.1–2)
BUN BLD-MCNC: 15 MG/DL (ref 7–18)
CALCIUM BLD-MCNC: 9.1 MG/DL (ref 8.5–10.1)
CHLORIDE SERPL-SCNC: 106 MMOL/L (ref 98–112)
CO2 SERPL-SCNC: 31 MMOL/L (ref 21–32)
CREAT BLD-MCNC: 0.74 MG/DL
FASTING STATUS PATIENT QL REPORTED: YES
GFR SERPLBLD BASED ON 1.73 SQ M-ARVRAT: 92 ML/MIN/1.73M2 (ref 60–?)
GLOBULIN PLAS-MCNC: 3.2 G/DL (ref 2.8–4.4)
GLUCOSE BLD-MCNC: 89 MG/DL (ref 70–99)
OSMOLALITY SERPL CALC.SUM OF ELEC: 292 MOSM/KG (ref 275–295)
POTASSIUM SERPL-SCNC: 3.6 MMOL/L (ref 3.5–5.1)
PROT SERPL-MCNC: 6.6 G/DL (ref 6.4–8.2)
SODIUM SERPL-SCNC: 141 MMOL/L (ref 136–145)

## 2023-04-26 PROCEDURE — 80053 COMPREHEN METABOLIC PANEL: CPT

## 2023-04-26 PROCEDURE — 36415 COLL VENOUS BLD VENIPUNCTURE: CPT

## 2023-05-01 ENCOUNTER — ANESTHESIA (OUTPATIENT)
Dept: SURGERY | Facility: HOSPITAL | Age: 83
End: 2023-05-01
Payer: MEDICARE

## 2023-05-01 ENCOUNTER — ANESTHESIA EVENT (OUTPATIENT)
Dept: SURGERY | Facility: HOSPITAL | Age: 83
End: 2023-05-01
Payer: MEDICARE

## 2023-05-01 ENCOUNTER — HOSPITAL ENCOUNTER (OUTPATIENT)
Facility: HOSPITAL | Age: 83
Setting detail: HOSPITAL OUTPATIENT SURGERY
Discharge: HOME | End: 2023-05-01
Attending: SURGERY | Admitting: SURGERY
Payer: MEDICARE

## 2023-05-01 VITALS
TEMPERATURE: 98 F | RESPIRATION RATE: 14 BRPM | OXYGEN SATURATION: 99 % | WEIGHT: 193.13 LBS | HEART RATE: 62 BPM | BODY MASS INDEX: 29.27 KG/M2 | HEIGHT: 68 IN | SYSTOLIC BLOOD PRESSURE: 113 MMHG | DIASTOLIC BLOOD PRESSURE: 78 MMHG

## 2023-05-01 DIAGNOSIS — D17.0 LIPOMA OF NECK: Primary | ICD-10-CM

## 2023-05-01 PROCEDURE — 0JB40ZZ EXCISION OF RIGHT NECK SUBCUTANEOUS TISSUE AND FASCIA, OPEN APPROACH: ICD-10-PCS | Performed by: SURGERY

## 2023-05-01 PROCEDURE — 88304 TISSUE EXAM BY PATHOLOGIST: CPT | Performed by: SURGERY

## 2023-05-01 RX ORDER — HYDROCODONE BITARTRATE AND ACETAMINOPHEN 5; 325 MG/1; MG/1
1 TABLET ORAL EVERY 6 HOURS PRN
Qty: 4 TABLET | Refills: 0 | Status: SHIPPED | OUTPATIENT
Start: 2023-05-01

## 2023-05-01 RX ORDER — HEPARIN SODIUM 5000 [USP'U]/ML
5000 INJECTION, SOLUTION INTRAVENOUS; SUBCUTANEOUS ONCE
Status: COMPLETED | OUTPATIENT
Start: 2023-05-01 | End: 2023-05-01

## 2023-05-01 RX ORDER — LIDOCAINE HYDROCHLORIDE AND EPINEPHRINE 10; 10 MG/ML; UG/ML
INJECTION, SOLUTION INFILTRATION; PERINEURAL AS NEEDED
Status: DISCONTINUED | OUTPATIENT
Start: 2023-05-01 | End: 2023-05-01 | Stop reason: HOSPADM

## 2023-05-01 RX ORDER — CEFAZOLIN SODIUM/WATER 2 G/20 ML
2 SYRINGE (ML) INTRAVENOUS ONCE
Status: COMPLETED | OUTPATIENT
Start: 2023-05-01 | End: 2023-05-01

## 2023-05-01 RX ORDER — ACETAMINOPHEN 500 MG
1000 TABLET ORAL ONCE
Status: DISCONTINUED | OUTPATIENT
Start: 2023-05-01 | End: 2023-05-01 | Stop reason: HOSPADM

## 2023-05-01 RX ORDER — BUPIVACAINE HYDROCHLORIDE 5 MG/ML
INJECTION, SOLUTION EPIDURAL; INTRACAUDAL AS NEEDED
Status: DISCONTINUED | OUTPATIENT
Start: 2023-05-01 | End: 2023-05-01 | Stop reason: HOSPADM

## 2023-05-01 RX ORDER — SODIUM CHLORIDE, SODIUM LACTATE, POTASSIUM CHLORIDE, CALCIUM CHLORIDE 600; 310; 30; 20 MG/100ML; MG/100ML; MG/100ML; MG/100ML
INJECTION, SOLUTION INTRAVENOUS CONTINUOUS
Status: DISCONTINUED | OUTPATIENT
Start: 2023-05-01 | End: 2023-05-01

## 2023-05-01 RX ADMIN — CEFAZOLIN SODIUM/WATER 2 G: 2 G/20 ML SYRINGE (ML) INTRAVENOUS at 18:03:00

## 2023-05-01 RX ADMIN — SODIUM CHLORIDE, SODIUM LACTATE, POTASSIUM CHLORIDE, CALCIUM CHLORIDE: 600; 310; 30; 20 INJECTION, SOLUTION INTRAVENOUS at 18:52:00

## 2023-05-01 NOTE — DISCHARGE INSTRUCTIONS
Home Care Instructions  Minor Surgery  Dr. Medina Moon    MEDICATIONS  For post-operative pain control the mediations are usually over the counter preparations such as Advil and Tylenol. For severe pain the patient may overlap Advil with Tylenol. The patient can do this by taking two Tylenol, then three hours later taking two Advil, then three hours later taking Tylenol again. If pain is still not controlled, you can take one Norco every 6 hours. All other home medications may be resumed as scheduled. Generally aspirin is avoided for ten days. DIET  The patient may resume a general diet immediately. There should be no alcohol consumption in the immediate recover time period. If the patient was sedated for the procedure the first meal should be light. WOUND CARE  The top dressing may be removed the day after surgery. This includes the gauze, tape and band-aids if they are present. Do not remove the steri-strips or butterfly tapes that are white and adherent to the skin. The steri-strips will eventually peel up at the ends and at this point they may be removed. This is usually seven to ten days after surgery. The patient may shower the day after surgery. There is no need to cover the incisions, and all top gauze type dressing should be removed prior to showering. Soap can get on the wounds but do not scrub over the wounds. No hair dye or chemicals of any kind should get in the wounds. Avoid tub baths for two weeks. Most wounds will be closed with dissolving suture underneath the skin. These sutures will dissolve on their own. The doctor or nurse will remove any visible sutures, or staples, in the office. ACTIVITY  The patient may ride in a car but should not drive the car for at least overnight if sedation was used. If no sedation was used the patient may drive immediately. The patient may return to work the next day.   Avoid any activity that could lead to the wound getting elbowed or bumped. Avoid bending, pushing, pulling and lifting anything heavier than 25 pounds for two weeks. Patients should seek further activity limits at the time of their appointment. No extreme sports for two weeks. APPOINTMENT  Please call our office today for an appointment within five to ten days of discharge. Any fever greater than 100.5, chills, nausea, vomiting, or severe diarrhea please call our office. If the wound turns red, hot, swollen, becomes increasingly painful, or drains pus call us immediately at [phone]. For life threatening emergencies call 911. For non-emergent care please call our office after 8:30 a.m. Monday through Friday. The number listed above is our office number. Our phone automatically switches to out answering service if we are not there. Please do not use the emergency room for non-urgent care. Thank you for entrusting us with your care.   [facility]

## 2023-05-09 ENCOUNTER — OFFICE VISIT (OUTPATIENT)
Facility: LOCATION | Age: 83
End: 2023-05-09

## 2023-05-09 VITALS — HEART RATE: 59 BPM | TEMPERATURE: 96 F

## 2023-05-09 DIAGNOSIS — Z98.890 POSTOPERATIVE STATE: Primary | ICD-10-CM

## 2023-05-09 PROCEDURE — 99024 POSTOP FOLLOW-UP VISIT: CPT

## 2023-07-10 NOTE — PROGRESS NOTES
REASON FOR VISIT:    Rudy Cardenas is a 68year old male who presents for a Medicare Annual Wellness visit, urinary incontinence, scalp cyst.    HPI:     Patient Care Team: Patient Care Team:  Rosario Bonds MD as PCP - General (Family Practice)  Hannah Nowak mg/dL 142 172 155 154 164 162 159   Triglycerides 30 - 149 mg/dL 138 153(H) 110 104 198(H) 160(H) 139   HDL 40 - 59 mg/dL 49 50 51 56 56 49 50   LDL <100 mg/dL 65 91 82 77 68 81 81     BUN and Cr Latest Ref Rng & Units 1/28/2021 12/26/2019 10/18/2018 12/11 medications?: Yes  Hearing Problems?: Yes     Functional Status     Hearing Problems?: Yes  Vision Problems? : Yes  Difficulty walking?: Yes  Difficulty dressing or bathing?: No  Problems with daily activities? : No  Memory Problems?: No      Fall/Risk Ass preventive care reminders to display for this patient. Flex Sigmoidoscopy Screen every 5 years No results found for this or any previous visit.     Fecal Occult Blood Annually Occult Blood Result (no units)   Date Value   02/19/2016 Positive for Occult B Influenza             10/11/2017      Pneumococcal (Prevnar 13)                          12/11/2017      Pneumovax 23          12/26/2019      TDAP                  02/10/2012        SOCIAL HISTORY:   Social History    Tobacco Use      Smoking status: tender, FROM of the back  EXTREMITIES: no cyanosis, clubbing or edema  NEURO: Oriented times three, cranial nerves are intact, motor and sensory are grossly intact      ASSESSMENT AND PLAN OF RELEVANT CHRONIC CONDITIONS:   Joel Dia is a 68year old mal Wound Care: Petrolatum

## 2023-08-21 ENCOUNTER — HOSPITAL ENCOUNTER (OUTPATIENT)
Dept: ULTRASOUND IMAGING | Age: 83
Discharge: HOME | End: 2023-08-21
Attending: SURGERY
Payer: MEDICARE

## 2023-08-21 DIAGNOSIS — N28.89 RENAL MASS: ICD-10-CM

## 2023-08-21 PROCEDURE — 76770 US EXAM ABDO BACK WALL COMP: CPT | Performed by: SURGERY

## 2023-08-25 ENCOUNTER — OFFICE VISIT (OUTPATIENT)
Dept: SURGERY | Facility: CLINIC | Age: 83
End: 2023-08-25

## 2023-08-25 DIAGNOSIS — N28.89 RENAL MASS: ICD-10-CM

## 2023-08-25 DIAGNOSIS — R82.90 URINE FINDING: Primary | ICD-10-CM

## 2023-08-25 LAB
APPEARANCE: CLEAR
BILIRUBIN: NEGATIVE
GLUCOSE (URINE DIPSTICK): NEGATIVE MG/DL
LEUKOCYTES: NEGATIVE
MULTISTIX LOT#: ABNORMAL NUMERIC
NITRITE, URINE: NEGATIVE
OCCULT BLOOD: NEGATIVE
PH, URINE: 6.5 (ref 4.5–8)
PROTEIN (URINE DIPSTICK): 30 MG/DL
SPECIFIC GRAVITY: 1.02 (ref 1–1.03)
URINE-COLOR: YELLOW
UROBILINOGEN,SEMI-QN: 0.2 MG/DL (ref 0–1.9)

## 2023-08-25 PROCEDURE — 99214 OFFICE O/P EST MOD 30 MIN: CPT | Performed by: SURGERY

## 2023-08-25 PROCEDURE — 81003 URINALYSIS AUTO W/O SCOPE: CPT | Performed by: SURGERY

## 2023-08-25 NOTE — PROGRESS NOTES
Urology Clinic Note    Primary Care Provider:  Justyna Hitchcock MD     Chief Complaint:   Follow-up for renal mass and prostate cancer     HPI:   Naida Nash is a [de-identified]year old male with history of GERD, thyroid disease, sleep apnea on CPAP, prostate cancer status post radical prostatectomy in 2002, stable 2.5 cm right renal mass (they have opted for active surveillance) who was previously followed by Dr. Stephani Kinney. I saw him last on 2/2/2023. At that time his stress urinary incontinence was significantly improved since losing some weight and increase his Kegel exercises. He is not having abdominal or flank pain or gross hematuria. His last PSA was 0.02 on 1/31/2023. Last renal imaging was a CT scan on 10/13/2022. This showed a 2.5 cm enhancing right renal mass that was approximately stable from prior. Since his renal mass was stable in size he elected for continued surveillance with an ultrasound in 6 months. Ultrasound was performed on 8/21/2023, that showed the same 2.5 cm right renal mass stable in size. He is doing well today. Denies gross hematuria or flank pain. Stress incontinence is persistent but mild. He uses typically 2 pads per day and they are only mildly wet. Urinalysis: Negative    PSA:  Lab Results   Component Value Date    PSA 0.02 01/31/2023    PSA 0.02 10/18/2018    PSA 0.012 12/11/2017    PSAS <0.01 01/28/2021        History:     Past Medical History:   Diagnosis Date    Back problem     sciatica    Cancer ([facility] [address] 2002    Prostate    Disorder of thyroid     Exposure to medical diagnostic radiation 2002    38 TX'S    GERD (gastroesophageal reflux disease)     Hearing impairment     hearing aids    High blood pressure     High cholesterol     History of cardioversion     Hypothyroidism     Osteoarthritis     Renal disorder     small mass on kidney, followed conservatively at this time.     Sleep apnea     c pap     Thyroid disease     Unspecified essential hypertension Visual impairment     glasses       Past Surgical History:   Procedure Laterality Date    COLONOSCPY, FLEXIBLE, PROXIMAL TO SPLENIC FLEXURE; W/DIRECTED SUBMUCOSA INJECTION(S), ANY SUBSTANCE  2016    EYE SURGERY Right 09/2020    GLAUCOMA    LAPAROSCOPY, SURGICAL PROSTATECTOMY, RETROPUBIC RADICAL, W/NERVE SPARING  02/2002       No family history on file. Social History     Socioeconomic History    Marital status:    Tobacco Use    Smoking status: Former     Types: Cigars    Smokeless tobacco: Never   Vaping Use    Vaping Use: Never used   Substance and Sexual Activity    Alcohol use: Yes     Comment: couple drinks a month    Drug use: No   Other Topics Concern    Caffeine Concern Yes     Comment: coffee 3 cups daily    Exercise Yes    Seat Belt Yes       Medications (Active prior to today's visit):  Current Outpatient Medications   Medication Sig Dispense Refill    HYDROcodone-acetaminophen (NORCO) 5-325 MG Oral Tab Take 1 tablet by mouth every 6 (six) hours as needed for Pain. 4 tablet 0    ibuprofen 200 MG Oral Tab Take 2 tablets (400 mg total) by mouth every 8 (eight) hours as needed for Pain. LEVOTHYROXINE 50 MCG Oral Tab TAKE 1 TABLET(50 MCG) BY MOUTH EVERY MORNING BEFORE BREAKFAST 90 tablet 1    OMEPRAZOLE 40 MG Oral Capsule Delayed Release TAKE 1 CAPSULE(40 MG) BY MOUTH DAILY 90 capsule 3    Turmeric (QC TUMERIC COMPLEX OR) Take 1 capsule by mouth daily. Metoprolol Succinate ER 50 MG Oral Tablet 24 Hr Take 1 tablet (50 mg total) by mouth daily. Spironolactone-HCTZ 25-25 MG Oral Tab Take 1 tablet by mouth daily. Multiple Vitamins-Minerals (CENTRUM SILVER) Oral Tab Take 1 tablet by mouth daily. Atorvastatin Calcium (LIPITOR) 10 MG Oral Tab Take 1 tablet (10 mg total) by mouth nightly. ASPIRIN 81 MG OR TABS Take 1 tablet (81 mg total) by mouth at bedtime. COQ10 30 MG OR CAPS Take 1 capsule by mouth daily.          Allergies:    Simvastatin             OTHER (SEE COMMENTS)    Comment:Stomach Ache    Review of Systems:   A comprehensive 10-point review of systems was completed. Pertinent positives and negatives are noted in the the HPI. Physical Exam:   CONSTITUTIONAL: Well developed, well nourished, in no acute distress  NEUROLOGIC: Alert and oriented  HEAD: Normocephalic, atraumatic  EYES: Sclera non-icteric  ENT: Hearing intact, moist mucous membranes  NECK: No obvious goiter or masses  RESPIRATORY: Normal respiratory effort  SKIN: No evident rashes  ABDOMEN: Soft, non-tender, non-distended      Assessment & Plan:   Leanne Méndez is a [de-identified]year old male with history of GERD, thyroid disease, sleep apnea on CPAP, prostate cancer status post radical prostatectomy in 2002, stable 2.5 cm right renal mass (they have opted for active surveillance) who was previously followed by Dr. Joel Machado. I saw him last on 2/2/2023. At that time his stress urinary incontinence was significantly improved since losing some weight and increase his Kegel exercises. He is not having abdominal or flank pain or gross hematuria. His last PSA was 0.02 on 1/31/2023. Last renal imaging was a CT scan on 10/13/2022. This showed a 2.5 cm enhancing right renal mass that was approximately stable from prior. Since his renal mass was stable in size he elected for continued surveillance with an ultrasound in 6 months. Ultrasound was performed on 8/21/2023, that showed the same 2.5 cm right renal mass stable in size. He is doing well today. Denies gross hematuria or flank pain. Stress incontinence is persistent but mild. He uses typically 2 pads per day and they are only mildly wet. -Continue yearly PSA checks with PCP  -Renal ultrasound in 1 year for renal mass surveillance, return to clinic to see me after that    In total, 30 minutes were spent on this patient encounter (including chart review, patient history, physical, and counseling, documentation, and communication). Preethi Peterson. Ok Rodríguez MD  Staff Urologist  [facility]  Office: [phone]

## 2023-09-03 DIAGNOSIS — E03.9 ACQUIRED HYPOTHYROIDISM: ICD-10-CM

## 2023-09-05 RX ORDER — LEVOTHYROXINE SODIUM 0.05 MG/1
TABLET ORAL
Qty: 90 TABLET | Refills: 0 | Status: SHIPPED | OUTPATIENT
Start: 2023-09-05

## 2023-11-01 ENCOUNTER — OFFICE VISIT (OUTPATIENT)
Facility: LOCATION | Age: 83
End: 2023-11-01
Payer: MEDICARE

## 2023-11-01 DIAGNOSIS — H61.23 CERUMEN DEBRIS ON TYMPANIC MEMBRANE OF BOTH EARS: Primary | ICD-10-CM

## 2023-11-01 DIAGNOSIS — H90.3 SENSORINEURAL HEARING LOSS (SNHL) OF BOTH EARS: ICD-10-CM

## 2023-11-01 PROCEDURE — 99213 OFFICE O/P EST LOW 20 MIN: CPT | Performed by: OTOLARYNGOLOGY

## 2023-11-01 NOTE — PROGRESS NOTES
Noemy Cardona is a 82year old male. Patient presents with:  Cerumen Impaction    HPI:   He wears hearing aids. He is going for a new mold and they noted wax in his ears. REVIEW OF SYSTEMS:   GENERAL HEALTH: feels well otherwise  GENERAL : denies fever, chills, sweats, weight loss, weight gain  SKIN: denies any unusual skin lesions or rashes  RESPIRATORY: denies shortness of breath with exertion  NEURO: denies headaches    EXAM:   There were no vitals taken for this visit. System Findings Details   Constitutional  Overall appearance - Normal.   Psychiatric  Orientation - Oriented to time, place, person & situation. Appropriate mood and affect. Head/Face  Facial features -- Normal. Skull - Normal.   Eyes  Pupils equal ,round ,react to light and accomidate   Ears, Nose, Throat, Neck  A mild amount of wax in ears bilaterally removed today under the microscope otherwise no infection tympanic membrane is normal   Neurological  Memory - Normal. Cranial nerves - Cranial nerves II through XII grossly intact. Lymph Detail  Submental. Submandibular. Anterior cervical. Posterior cervical. Supraclavicular. ASSESSMENT AND PLAN:   1. Cerumen debris on tympanic membrane of both ears  Removed today. 2. Sensorineural hearing loss (SNHL) of both ears  He will follow-up with audiology for new earmolds. The patient indicates understanding of these issues and agrees to the plan. No follow-ups on file.     Estefani Cazares MD  11/1/2023  1:03 PM

## 2023-12-27 DIAGNOSIS — E03.9 ACQUIRED HYPOTHYROIDISM: ICD-10-CM

## 2023-12-27 RX ORDER — LEVOTHYROXINE SODIUM 0.05 MG/1
TABLET ORAL
Qty: 90 TABLET | Refills: 0 | Status: SHIPPED | OUTPATIENT
Start: 2023-12-27

## 2024-03-05 DIAGNOSIS — K21.9 GASTROESOPHAGEAL REFLUX DISEASE, UNSPECIFIED WHETHER ESOPHAGITIS PRESENT: ICD-10-CM

## 2024-03-06 RX ORDER — OMEPRAZOLE 40 MG/1
40 CAPSULE, DELAYED RELEASE ORAL DAILY
Qty: 90 CAPSULE | Refills: 3 | Status: SHIPPED | OUTPATIENT
Start: 2024-03-06

## 2024-03-12 ENCOUNTER — TELEPHONE (OUTPATIENT)
Dept: FAMILY MEDICINE CLINIC | Facility: CLINIC | Age: 84
End: 2024-03-12

## 2024-03-12 DIAGNOSIS — Z85.46 HISTORY OF PROSTATE CANCER: ICD-10-CM

## 2024-03-12 DIAGNOSIS — E03.9 ACQUIRED HYPOTHYROIDISM: Primary | ICD-10-CM

## 2024-03-12 DIAGNOSIS — Z90.79 STATUS POST PROSTATECTOMY: ICD-10-CM

## 2024-03-12 DIAGNOSIS — E55.9 VITAMIN D DEFICIENCY: ICD-10-CM

## 2024-03-12 DIAGNOSIS — I10 HTN (HYPERTENSION), BENIGN: ICD-10-CM

## 2024-03-12 NOTE — TELEPHONE ENCOUNTER
Pt requesting annual lab orders be placed for AWV. Pt has never had Vit D checked. Please advise if you want this ordered & if so, what diagnosis?

## 2024-03-13 NOTE — TELEPHONE ENCOUNTER
HL: lipids, CMP  Fatigue: TSH, CBC  Vit. D def: vit. D    Screening for PSA, this is what I use for all medicare patients its all covered

## 2024-03-28 ENCOUNTER — LAB ENCOUNTER (OUTPATIENT)
Dept: LAB | Age: 84
End: 2024-03-28
Attending: FAMILY MEDICINE
Payer: MEDICARE

## 2024-03-28 DIAGNOSIS — I10 HTN (HYPERTENSION), BENIGN: ICD-10-CM

## 2024-03-28 DIAGNOSIS — Z85.46 HISTORY OF PROSTATE CANCER: ICD-10-CM

## 2024-03-28 DIAGNOSIS — E03.9 ACQUIRED HYPOTHYROIDISM: ICD-10-CM

## 2024-03-28 DIAGNOSIS — Z90.79 STATUS POST PROSTATECTOMY: ICD-10-CM

## 2024-03-28 DIAGNOSIS — E55.9 VITAMIN D DEFICIENCY: ICD-10-CM

## 2024-03-28 LAB
ALBUMIN SERPL-MCNC: 3.8 G/DL (ref 3.4–5)
ALBUMIN/GLOB SERPL: 1.2 {RATIO} (ref 1–2)
ALP LIVER SERPL-CCNC: 62 U/L
ALT SERPL-CCNC: 28 U/L
ANION GAP SERPL CALC-SCNC: 6 MMOL/L (ref 0–18)
AST SERPL-CCNC: 19 U/L (ref 15–37)
BASOPHILS # BLD AUTO: 0.04 X10(3) UL (ref 0–0.2)
BASOPHILS NFR BLD AUTO: 0.6 %
BILIRUB SERPL-MCNC: 1.7 MG/DL (ref 0.1–2)
BUN BLD-MCNC: 20 MG/DL (ref 9–23)
CALCIUM BLD-MCNC: 9.9 MG/DL (ref 8.5–10.1)
CHLORIDE SERPL-SCNC: 107 MMOL/L (ref 98–112)
CHOLEST SERPL-MCNC: 142 MG/DL (ref ?–200)
CO2 SERPL-SCNC: 29 MMOL/L (ref 21–32)
CREAT BLD-MCNC: 0.92 MG/DL
EGFRCR SERPLBLD CKD-EPI 2021: 84 ML/MIN/1.73M2 (ref 60–?)
EOSINOPHIL # BLD AUTO: 0.2 X10(3) UL (ref 0–0.7)
EOSINOPHIL NFR BLD AUTO: 2.9 %
ERYTHROCYTE [DISTWIDTH] IN BLOOD BY AUTOMATED COUNT: 14.3 %
FASTING PATIENT LIPID ANSWER: YES
FASTING STATUS PATIENT QL REPORTED: YES
GLOBULIN PLAS-MCNC: 3.1 G/DL (ref 2.8–4.4)
GLUCOSE BLD-MCNC: 107 MG/DL (ref 70–99)
HCT VFR BLD AUTO: 50.1 %
HDLC SERPL-MCNC: 59 MG/DL (ref 40–59)
HGB BLD-MCNC: 16.4 G/DL
IMM GRANULOCYTES # BLD AUTO: 0.02 X10(3) UL (ref 0–1)
IMM GRANULOCYTES NFR BLD: 0.3 %
LDLC SERPL CALC-MCNC: 66 MG/DL (ref ?–100)
LYMPHOCYTES # BLD AUTO: 1.8 X10(3) UL (ref 1–4)
LYMPHOCYTES NFR BLD AUTO: 25.7 %
MCH RBC QN AUTO: 30.5 PG (ref 26–34)
MCHC RBC AUTO-ENTMCNC: 32.7 G/DL (ref 31–37)
MCV RBC AUTO: 93.1 FL
MONOCYTES # BLD AUTO: 0.66 X10(3) UL (ref 0.1–1)
MONOCYTES NFR BLD AUTO: 9.4 %
NEUTROPHILS # BLD AUTO: 4.28 X10 (3) UL (ref 1.5–7.7)
NEUTROPHILS # BLD AUTO: 4.28 X10(3) UL (ref 1.5–7.7)
NEUTROPHILS NFR BLD AUTO: 61.1 %
NONHDLC SERPL-MCNC: 83 MG/DL (ref ?–130)
OSMOLALITY SERPL CALC.SUM OF ELEC: 297 MOSM/KG (ref 275–295)
PLATELET # BLD AUTO: 218 10(3)UL (ref 150–450)
POTASSIUM SERPL-SCNC: 4.2 MMOL/L (ref 3.5–5.1)
PROT SERPL-MCNC: 6.9 G/DL (ref 6.4–8.2)
PSA SERPL-MCNC: 0.02 NG/ML (ref ?–4)
RBC # BLD AUTO: 5.38 X10(6)UL
SODIUM SERPL-SCNC: 142 MMOL/L (ref 136–145)
TRIGL SERPL-MCNC: 88 MG/DL (ref 30–149)
TSI SER-ACNC: 1.77 MIU/ML (ref 0.36–3.74)
VIT D+METAB SERPL-MCNC: 39.2 NG/ML (ref 30–100)
VLDLC SERPL CALC-MCNC: 13 MG/DL (ref 0–30)
WBC # BLD AUTO: 7 X10(3) UL (ref 4–11)

## 2024-03-28 PROCEDURE — 84443 ASSAY THYROID STIM HORMONE: CPT

## 2024-03-28 PROCEDURE — 85025 COMPLETE CBC W/AUTO DIFF WBC: CPT

## 2024-03-28 PROCEDURE — 36415 COLL VENOUS BLD VENIPUNCTURE: CPT

## 2024-03-28 PROCEDURE — 80053 COMPREHEN METABOLIC PANEL: CPT

## 2024-03-28 PROCEDURE — 80061 LIPID PANEL: CPT

## 2024-03-28 PROCEDURE — 82306 VITAMIN D 25 HYDROXY: CPT

## 2024-03-28 PROCEDURE — 84153 ASSAY OF PSA TOTAL: CPT

## 2024-04-14 DIAGNOSIS — E03.9 ACQUIRED HYPOTHYROIDISM: ICD-10-CM

## 2024-04-15 ENCOUNTER — TELEPHONE (OUTPATIENT)
Dept: FAMILY MEDICINE CLINIC | Facility: CLINIC | Age: 84
End: 2024-04-15

## 2024-04-15 RX ORDER — LEVOTHYROXINE SODIUM 0.05 MG/1
50 TABLET ORAL
Qty: 90 TABLET | Refills: 0 | Status: SHIPPED | OUTPATIENT
Start: 2024-04-15 | End: 2024-07-16

## 2024-04-17 ENCOUNTER — OFFICE VISIT (OUTPATIENT)
Dept: FAMILY MEDICINE CLINIC | Facility: CLINIC | Age: 84
End: 2024-04-17
Payer: MEDICARE

## 2024-04-17 VITALS
OXYGEN SATURATION: 97 % | HEART RATE: 56 BPM | BODY MASS INDEX: 30.46 KG/M2 | RESPIRATION RATE: 18 BRPM | HEIGHT: 68 IN | WEIGHT: 201 LBS | DIASTOLIC BLOOD PRESSURE: 82 MMHG | SYSTOLIC BLOOD PRESSURE: 120 MMHG

## 2024-04-17 DIAGNOSIS — Z85.46 HISTORY OF PROSTATE CANCER: ICD-10-CM

## 2024-04-17 DIAGNOSIS — Z00.00 MEDICARE ANNUAL WELLNESS VISIT, SUBSEQUENT: Primary | ICD-10-CM

## 2024-04-17 DIAGNOSIS — H35.3221 EXUDATIVE AGE-RELATED MACULAR DEGENERATION OF LEFT EYE WITH ACTIVE CHOROIDAL NEOVASCULARIZATION (HCC): ICD-10-CM

## 2024-04-17 DIAGNOSIS — H40.1131 PRIMARY OPEN ANGLE GLAUCOMA (POAG) OF BOTH EYES, MILD STAGE: ICD-10-CM

## 2024-04-17 DIAGNOSIS — Z90.79 STATUS POST PROSTATECTOMY: ICD-10-CM

## 2024-04-17 DIAGNOSIS — I10 HTN (HYPERTENSION), BENIGN: ICD-10-CM

## 2024-04-17 DIAGNOSIS — M17.0 PRIMARY OSTEOARTHRITIS OF BOTH KNEES: ICD-10-CM

## 2024-04-17 DIAGNOSIS — R26.89 BALANCE PROBLEM: ICD-10-CM

## 2024-04-17 DIAGNOSIS — R01.1 HEART MURMUR: ICD-10-CM

## 2024-04-17 DIAGNOSIS — E03.9 ACQUIRED HYPOTHYROIDISM: ICD-10-CM

## 2024-04-17 PROBLEM — R22.1 NECK MASS: Status: RESOLVED | Noted: 2023-03-03 | Resolved: 2024-04-17

## 2024-04-17 PROCEDURE — G0439 PPPS, SUBSEQ VISIT: HCPCS | Performed by: FAMILY MEDICINE

## 2024-04-17 PROCEDURE — 99214 OFFICE O/P EST MOD 30 MIN: CPT | Performed by: FAMILY MEDICINE

## 2024-04-17 NOTE — PROGRESS NOTES
REASON FOR VISIT:    Ismael Angulo is a 83 year old male who presents for a Medicare Annual Wellness visit, balance problem    HPI:     Patient Care Team: Patient Care Team:  Karen Patricia MD as PCP - General (Family Practice)  Parviz Grider MD (GASTROENTEROLOGY)  Mat Euceda MD (PULMONARY DISEASES)  Roberto Ramirez MD as Consulting Physician (OPHTHALMOLOGY)  Brijesh Sunshine MD (CARDIOLOGY)  Prieto Christopher MD (UROLOGY)  Charly Jackson (OPHTHALMOLOGY)  Reginaldo Carr MD (Surgery, Orthopaedic)    Patient Active Problem List   Diagnosis    Acquired hypothyroidism    Status post prostatectomy    History of prostate cancer    Glaucoma    HTN (hypertension), benign    Exudative age-related macular degeneration of left eye with active choroidal neovascularization (HCC)    Balance problem    Medicare annual wellness visit, subsequent   Pt has mild balance problem, last years, did therapy few years ago, really helped, now uses cane and that helps, pt is active at home, going to health club.Bilateral knees OA, pt has Synvisc injections twice a year.    Current Outpatient Medications   Medication Sig Dispense Refill    levothyroxine 50 MCG Oral Tab Take 1 tablet (50 mcg total) by mouth before breakfast. 90 tablet 0    TURMERIC OR Take 1 tablet by mouth daily.      ALPHA LIPOIC ACID OR Take 1 tablet by mouth daily.      Omeprazole 40 MG Oral Capsule Delayed Release Take 1 capsule (40 mg total) by mouth daily. 90 capsule 3    ibuprofen 200 MG Oral Tab Take 2 tablets (400 mg total) by mouth every 8 (eight) hours as needed for Pain.      Metoprolol Succinate ER 50 MG Oral Tablet 24 Hr Take 1 tablet (50 mg total) by mouth daily.      Spironolactone-HCTZ 25-25 MG Oral Tab Take 1 tablet by mouth daily.      Multiple Vitamins-Minerals (CENTRUM SILVER) Oral Tab Take 1 tablet by mouth daily.      Atorvastatin Calcium (LIPITOR) 10 MG Oral Tab Take 1 tablet (10 mg total) by mouth nightly.      ASPIRIN 81 MG OR TABS Take 1  tablet (81 mg total) by mouth at bedtime.      COQ10 30 MG OR CAPS Take 1 capsule by mouth daily.             Latest Ref Rng & Units 3/28/2024    11:04 AM 4/26/2023     9:04 AM 1/31/2023    10:20 AM 1/19/2022    10:24 AM 8/11/2021     4:21 PM 1/28/2021     9:19 AM 12/26/2019    10:41 AM   Glucose and HbA1c   Glucose 70 - 99 mg/dL 107  89  111  107  93  96  107          Latest Ref Rng & Units 3/28/2024    11:04 AM 1/31/2023    10:20 AM 1/19/2022    10:24 AM 1/28/2021     9:19 AM 12/26/2019    10:41 AM 10/18/2018    10:46 AM 12/11/2017    12:23 PM   Cholesterol   Total Cholesterol <200 mg/dL 142  136  141  142  172  155  154    Triglycerides 30 - 149 mg/dL 88  103  118  138  153  110  104    HDL 40 - 59 mg/dL 59  55  49  49  50  51  56    LDL <100 mg/dL 66  62  71  65  91  82  77          Latest Ref Rng & Units 3/28/2024    11:04 AM 4/26/2023     9:04 AM 1/31/2023    10:20 AM 1/19/2022    10:24 AM 8/11/2021     4:21 PM 1/28/2021     9:19 AM 12/26/2019    10:41 AM   BUN and Cr   BUN 9 - 23 mg/dL 20  15  18  18  18  17  19    Creatinine 0.70 - 1.30 mg/dL 0.92  0.74  0.81  0.71  0.81  0.77  0.81          Latest Ref Rng & Units 3/28/2024    11:04 AM 4/26/2023     9:04 AM 1/31/2023    10:20 AM 1/19/2022    10:24 AM 8/11/2021     4:21 PM 1/28/2021     9:19 AM 12/26/2019    10:41 AM   AST and ALT   AST (SGOT) 15 - 37 U/L 19  18  19  18  21  23  23    ALT (SGPT) 16 - 61 U/L 28  28  22  27  36  33  33          Latest Ref Rng & Units 3/28/2024    11:04 AM 1/31/2023    10:20 AM 1/19/2022    10:24 AM 1/28/2021     9:19 AM 12/26/2019    10:41 AM 10/18/2018    10:46 AM 12/11/2017    12:23 PM   TSH and Free T4   TSH 0.358 - 3.740 mIU/mL 1.770  1.570  1.300  1.950  1.680  1.200  1.330    Free T4 0.8 - 1.7 ng/dL     0.9            Latest Ref Rng & Units 3/28/2024    11:04 AM 1/31/2023    10:20 AM 10/18/2018    10:46 AM 12/11/2017    12:23 PM   [facility] WELLNESS LAB REVIEW FLOWSHEET PSA   PSA <=4.00 ng/mL 0.02  0.02  0.02  0.012         General Health      Has your appetite been poor?: Yes  Type of Diet: Balanced  How does the patient maintain a good energy level?: Daily Walks  How would you describe your daily physical activity?: Moderate  How would you describe your current health state?: Good  How do you maintain positive mental well-being?: Social Interaction;Visiting Friends;Visiting Family  Have you had any immunizations at another office such as Influenza, Hepatitis B, Tetanus, or Pneumococcal?: Yes     Functional Ability     Bathing or Showering: Able without help  Toileting: Able without help  Dressing: Able without help  Eating: Able without help  Driving: Able without help  Preparing your meals: Able without help  Managing money/bills: Able without help  Taking medications as prescribed: Able without help  Are you able to afford your medications?: Yes  Hearing Problems?: Yes     Functional Status     Hearing Problems?: Yes  Vision Problems? : Yes  Difficulty walking?: Yes  Difficulty dressing or bathing?: No  Problems with daily activities? : No  Memory Problems?: No      Fall/Risk Assessment            As above     Depression Screening (PHQ-2/PHQ-9): Over the LAST 2 WEEKS     no depression       Advance Directives     Do you have a healthcare power of ?: Yes  Do you have a living will?: Yes     Cognitive Assessment     What day of the week is this?: Correct  What month is it?: Correct  What year is it?: Correct  Recall \"Ball\": Correct  Recall \"Flag\": Correct  Recall \"Tree\": Correct         PREVENTATIVE SERVICES   INDICATIONS AND SCHEDULE Internal Lab or Procedure   Diabetes Screening     HbgA1C   Annually No results found for: \"A1C\"    Fasting Blood Sugar (FSB)Annually Glucose (mg/dL)   Date Value   03/28/2024 107 (H)   06/22/2006 93     GLUCOSE (mg/dL)   Date Value   09/12/2013 122 (H)      Cardiovascular Disease Screening    LDL Annually LDL Cholesterol Calc (mg/dL)   Date Value   12/21/2006 124 (H)     LDL Cholesterol  (mg/dL)   Date Value   03/28/2024 66     LDL CHOLESTROL (mg/dL)   Date Value   02/24/2012 79       EKG - w/ Initial Preventative Physical Exam only, or if medically necessary yes   Colorectal Cancer Screening     Colonoscopy Screen every 10 years No recommendations at this time    Flex Sigmoidoscopy Screen every 5 years No results found for this or any previous visit.    Fecal Occult Blood Annually Occult Blood Result (no units)   Date Value   02/19/2016 Positive for Occult Blood (A)      Glaucoma Screening     Ophthalmology Visit Annually yes   Immunizations     Zoster (Not covered by Medicare Part B) No orders found for this or any previous visit.     SPECIFIC DISEASE MONITORING Internal Lab or Procedure     Annual Monitoring of Persistent Medications  (ACE/ARB, Digoxin, Diuretics)        Potassium  Annually Potassium (mmol/L)   Date Value   03/28/2024 4.2   03/13/2006 3.7     POTASSIUM (mmol/L)   Date Value   09/12/2013 3.1 (L)       Creatinine  Annually CREATININE (mg/dL)   Date Value   09/12/2013 0.81     Creatinine, Serum (mg/dL)   Date Value   03/13/2006 0.9     Creatinine (mg/dL)   Date Value   03/28/2024 0.92       Digoxin Serum Conc  Annually No results found for: \"DIGOXIN\"          ALLERGIES:     Allergies   Allergen Reactions    Simvastatin OTHER (SEE COMMENTS)     Stomach Ache     MEDICAL INFORMATION:   Past Medical History:    Back problem    sciatica    Cancer (HCC)    Prostate    Disorder of thyroid    Exposure to medical diagnostic radiation    38 TX'S    GERD (gastroesophageal reflux disease)    Hearing impairment    hearing aids    High blood pressure    High cholesterol    History of cardioversion    Hypothyroidism    Osteoarthritis    Renal disorder    small mass on kidney, followed conservatively at this time.    Sleep apnea    c pap     Thyroid disease    Unspecified essential hypertension    Visual impairment    glasses      Past Surgical History:   Procedure Laterality Date    Colonoscpy,  flexible, proximal to splenic flexure; w/directed submucosa injection(s), any substance  2016    Eye surgery Right 09/2020    GLAUCOMA    Laparoscopy, surgical prostatectomy, retropubic radical, w/nerve sparing  02/2002      History reviewed. No pertinent family history.  Immunization History      Immunization History  Administered            Date(s) Administered    Covid-19 Vaccine Moderna 100 mcg/0.5 ml                          03/02/2021 03/30/2021 01/02/2022      Covid-19 Vaccine Moderna 50 Mcg/0.25 Ml                          01/05/2022      FLU VAC High Dose 65 YRS & Older PRSV Free (39910)                          09/07/2018  10/09/2020  10/01/2021                            09/09/2022      FLUAD High Dose 65 yr and older (58324)                          10/01/2019  10/09/2020      Fluzone Vaccine Medicare ()                          10/14/2014  10/10/2015  11/14/2016                            10/11/2017  09/06/2018      HIGH DOSE FLU 65 YRS AND OLDER PRSV FREE SINGLE D ([id number]) FLU CLINIC                          10/11/2015  09/07/2018  10/01/2021                            10/12/2023      Influenza             10/11/2017      Pneumococcal (Prevnar 13)                          12/11/2017      Pneumovax 23          12/26/2019      TDAP                  02/10/2012        SOCIAL HISTORY:   Social History     Socioeconomic History    Marital status:    Tobacco Use    Smoking status: Former     Types: Cigars    Smokeless tobacco: Never   Vaping Use    Vaping status: Never Used   Substance and Sexual Activity    Alcohol use: Yes     Comment: couple drinks a month    Drug use: No   Other Topics Concern    Caffeine Concern Yes     Comment: coffee 3 cups daily    Exercise Yes    Seat Belt Yes     Social Determinants of Health     Physical Activity: Inactive (3/4/2021)    Received from [facility] [employer] [employer]    Exercise Vital Sign     Days of Exercise per Week: 0 days     Minutes  of Exercise per Session: 0 min        REVIEW OF SYSTEMS:   GENERAL: feels well otherwise  SKIN: denies any unusual skin lesions  EYES: denies blurred vision or double vision  HEENT: denies nasal congestion, sinus pain or ST  LUNGS: denies shortness of breath with exertion  CARDIOVASCULAR: denies chest pain on exertion  GI: denies abdominal pain, denies heartburn  : denies nocturia or changes in stream  MUSCULOSKELETAL: denies back pain  NEURO: denies headaches  PSYCHE: denies depression or anxiety  HEMATOLOGIC: denies hx of anemia  ENDOCRINE: denies thyroid history  ALL/ASTHMA: denies hx of allergy or asthma    EXAM:   /82   Pulse 56   Resp 18   Ht 5' 8\" (1.727 m)   Wt 201 lb (91.2 kg)   SpO2 97%   BMI 30.56 kg/m²    >   BP Readings from Last 3 Encounters:   04/17/24 120/82   05/01/23 113/78   04/25/23 134/70     GENERAL: well developed, well nourished, in no apparent distress, obese  SKIN: no rashes, no suspicious lesions  HEENT: atraumatic, normocephalic, no masses                Hearing Assessed via: Whispered Voice  EYES: PERRLA, EOMI, conjunctiva are clear normal optic disc  CHEST: no chest tenderness  LUNGS: clear to auscultation  CARDIO: RRR with systolic heart murmur  GI: good BS's, no masses, HSM or tenderness, mild ventral hernia.  RECTAL: deferred  MUSCULOSKELETAL: back is not tender, FROM of the back  EXTREMITIES: no cyanosis, clubbing or edema  NEURO: Oriented times three, cranial nerves are intact, motor and sensory are grossly intact      ASSESSMENT AND PLAN OF RELEVANT CHRONIC CONDITIONS:   Ismael Angulo is a 83 year old male who presents for a Medicare Assessment.     Balance problem: fall precaution d/w the pt.referral for PT.    Primary osteoarthritis of both knees: pt got Synvisc twice a year, help a lot.    Status post prostatectomy  History of prostate cancer: PSA normal, doing well.Sees urologist.    Acquired hypothyroidism: excellent control.    HTN (hypertension),  benign:  stable  81mg of ASA, low cholesterol and low salt diet and regular exercise encouraged.  All side effects vs benefits d/w the pt.  Medications: Metoprolol    Primary open angle glaucoma (POAG) of both eyes mild stage: sees ophthalmologist,Dr. Ramirez doing well.    Exudative age-related macular degeneration of left eye with active choroidal neovascularization (HCC): sees Dr. Jackson, new problem, injections help.    Ventral hernia with obstruction and without gangrene: pt lost weight, very stable      Heart murmur: referral to Dr. Sunshine, pt has appt in one month.    The patient indicates understanding of these issues and agrees to the plan.  The patient is asked to return in 6 months for office visit  Diet counseling perfomed    SUGGESTED VACCINATIONS - Influenza, Pneumococcal, Zoster, Tetanus   Influenza: No recommendations at this time  Pneumonia: No recommendations at this time  Shingrix shingles vaccine is due

## 2024-06-02 ENCOUNTER — HOSPITAL ENCOUNTER (OUTPATIENT)
Age: 84
Discharge: HOME | End: 2024-06-02
Attending: EMERGENCY MEDICINE
Payer: MEDICARE

## 2024-06-02 VITALS
TEMPERATURE: 98 F | DIASTOLIC BLOOD PRESSURE: 54 MMHG | SYSTOLIC BLOOD PRESSURE: 149 MMHG | HEART RATE: 70 BPM | OXYGEN SATURATION: 96 % | RESPIRATION RATE: 18 BRPM
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
## 2024-06-02 PROCEDURE — 99213 OFFICE O/P EST LOW 20 MIN: CPT

## 2024-06-02 PROCEDURE — 99214 OFFICE O/P EST MOD 30 MIN: CPT

## 2024-06-02 RX ORDER — TRIAMCINOLONE ACETONIDE 1 MG/G
CREAM TOPICAL 2 TIMES DAILY
Qty: 45 G | Refills: 0 | Status: SHIPPED | OUTPATIENT
Start: 2024-06-02 | End: 2024-06-09

## 2024-06-02 NOTE — ED PROVIDER NOTES
Patient Seen in: [facility]      History     Chief Complaint   Patient presents with    Rash Skin Problem     Stated Complaint: Rash or Skin Issue    Subjective:   HPI    82 yo male was sitting out at his [employer] softball game recently. Next morning noticed red spots to the back of the right thigh initially, now has several over the trunk and extremities. Extremely itchy. Took benadryl. No recent illness.     Objective:   Past Medical History:    Back problem    sciatica    Cancer (HCC)    Prostate    Disorder of thyroid    Exposure to medical diagnostic radiation    38 TX'S    GERD (gastroesophageal reflux disease)    Hearing impairment    hearing aids    High blood pressure    High cholesterol    History of cardioversion    Hypothyroidism    Osteoarthritis    Renal disorder    small mass on kidney, followed conservatively at this time.    Sleep apnea    c pap     Thyroid disease    Unspecified essential hypertension    Visual impairment    glasses              Past Surgical History:   Procedure Laterality Date    Colonoscpy, flexible, proximal to splenic flexure; w/directed submucosa injection(s), any substance  2016    Eye surgery Right 09/2020    GLAUCOMA    Laparoscopy, surgical prostatectomy, retropubic radical, w/nerve sparing  02/2002                Social History     Socioeconomic History    Marital status:    Tobacco Use    Smoking status: Former     Types: Cigars    Smokeless tobacco: Never   Vaping Use    Vaping status: Never Used   Substance and Sexual Activity    Alcohol use: Yes     Comment: couple drinks a month    Drug use: No   Other Topics Concern    Caffeine Concern Yes     Comment: coffee 3 cups daily    Exercise Yes    Seat Belt Yes     Social Determinants of Health     Physical Activity: Inactive (3/4/2021)    Received from [email or web address] [email or web address]    Exercise Vital Sign     Days of Exercise per Week: 0 days     Minutes of Exercise per  Session: 0 min              Review of Systems    Positive for stated complaint: Rash or Skin Issue  Other systems are as noted in HPI.  Constitutional and vital signs reviewed.      All other systems reviewed and negative except as noted above.    Physical Exam     ED Triage Vitals [06/02/24 1246]   /54   Pulse 70   Resp 18   Temp 98.4 °F (36.9 °C)   Temp src Temporal   SpO2 96 %   O2 Device None (Room air)       Current Vitals:   Vital Signs  BP: 149/54  Pulse: 70  Resp: 18  Temp: 98.4 °F (36.9 °C)  Temp src: Temporal    Oxygen Therapy  SpO2: 96 %  O2 Device: None (Room air)            Physical Exam  Vitals and nursing note reviewed.   Constitutional:       Appearance: Normal appearance. He is well-developed.   HENT:      Head: Normocephalic and atraumatic.   Cardiovascular:      Rate and Rhythm: Normal rate and regular rhythm.   Pulmonary:      Effort: Pulmonary effort is normal. No respiratory distress.   Skin:     General: Skin is warm and dry.      Capillary Refill: Capillary refill takes less than 2 seconds.      Comments: Scattered small erythematous papules. No grouped vesicles. Not tender. No cellulitis.    Neurological:      General: No focal deficit present.      Mental Status: He is alert.      Sensory: No sensory deficit.   Psychiatric:         Mood and Affect: Mood normal.         Behavior: Behavior normal.              ED Course   Labs Reviewed - No data to display                   MDM                                      Medical Decision Making  Exam findings consistent with bug bites. Discharge on otc antihistamine and topical triamcinolone.     Disposition and Plan     Clinical Impression:  1. Bug bite, initial encounter         Disposition:  Discharge  6/2/2024  1:16 pm    Follow-up:  Karen Patricia MD  [address]  [facility] [address]  [phone]      As needed          Medications Prescribed:  Discharge Medication List as of 6/2/2024  1:17 PM        START taking these medications     Details   triamcinolone 0.1 % External Cream Apply topically 2 (two) times daily for 7 days., Normal, Disp-45 g, R-0

## 2024-06-02 NOTE — ED INITIAL ASSESSMENT (HPI)
C/o onset Friday rashes to back of the right knee. Yesterday to right side/flank area and one spot mid back. Some itching. Treating with Cortisone cream.

## 2024-07-16 DIAGNOSIS — E03.9 ACQUIRED HYPOTHYROIDISM: ICD-10-CM

## 2024-07-16 RX ORDER — LEVOTHYROXINE SODIUM 0.05 MG/1
50 TABLET ORAL
Qty: 90 TABLET | Refills: 0 | Status: SHIPPED | OUTPATIENT
Start: 2024-07-16

## 2024-10-21 DIAGNOSIS — E03.9 ACQUIRED HYPOTHYROIDISM: ICD-10-CM

## 2024-10-21 RX ORDER — LEVOTHYROXINE SODIUM 50 UG/1
50 TABLET ORAL
Qty: 90 TABLET | Refills: 0 | Status: SHIPPED | OUTPATIENT
Start: 2024-10-21

## 2024-10-31 ENCOUNTER — HOSPITAL ENCOUNTER (OUTPATIENT)
Age: 84
Discharge: HOME | End: 2024-10-31
Payer: MEDICARE

## 2024-10-31 VITALS
HEART RATE: 56 BPM | OXYGEN SATURATION: 98 % | DIASTOLIC BLOOD PRESSURE: 66 MMHG | RESPIRATION RATE: 16 BRPM | SYSTOLIC BLOOD PRESSURE: 129 MMHG | TEMPERATURE: 97 F

## 2024-10-31 DIAGNOSIS — Z87.2 HISTORY OF CELLULITIS: ICD-10-CM

## 2024-10-31 DIAGNOSIS — R23.8 REDNESS AND SWELLING OF HAND: Primary | ICD-10-CM

## 2024-10-31 DIAGNOSIS — M79.89 REDNESS AND SWELLING OF HAND: Primary | ICD-10-CM

## 2024-10-31 LAB
#MXD IC: 0.7 X10ˆ3/UL (ref 0.1–1)
BUN BLD-MCNC: 19 MG/DL (ref 7–18)
CHLORIDE BLD-SCNC: 100 MMOL/L (ref 98–112)
CO2 BLD-SCNC: 27 MMOL/L (ref 21–32)
CREAT BLD-MCNC: 0.9 MG/DL
EGFRCR SERPLBLD CKD-EPI 2021: 85 ML/MIN/1.73M2 (ref 60–?)
GLUCOSE BLD-MCNC: 129 MG/DL (ref 70–99)
HCT VFR BLD AUTO: 45.9 %
HCT VFR BLD CALC: 45 %
HGB BLD-MCNC: 14.6 G/DL
ISTAT IONIZED CALCIUM FOR CHEM 8: 1.26 MMOL/L (ref 1.12–1.32)
LYMPHOCYTES # BLD AUTO: 1.5 X10ˆ3/UL (ref 1–4)
LYMPHOCYTES NFR BLD AUTO: 19.1 %
MCH RBC QN AUTO: 30.1 PG (ref 26–34)
MCHC RBC AUTO-ENTMCNC: 31.8 G/DL (ref 31–37)
MCV RBC AUTO: 94.6 FL (ref 80–100)
MIXED CELL %: 8.6 %
NEUTROPHILS # BLD AUTO: 5.4 X10ˆ3/UL (ref 1.5–7.7)
NEUTROPHILS NFR BLD AUTO: 72.3 %
PLATELET # BLD AUTO: 202 X10ˆ3/UL (ref 150–450)
POTASSIUM BLD-SCNC: 3.7 MMOL/L (ref 3.6–5.1)
RBC # BLD AUTO: 4.85 X10ˆ6/UL
SODIUM BLD-SCNC: 140 MMOL/L (ref 136–145)
WBC # BLD AUTO: 7.6 X10ˆ3/UL (ref 4–11)

## 2024-10-31 PROCEDURE — 96374 THER/PROPH/DIAG INJ IV PUSH: CPT

## 2024-10-31 PROCEDURE — 99214 OFFICE O/P EST MOD 30 MIN: CPT

## 2024-10-31 PROCEDURE — 85025 COMPLETE CBC W/AUTO DIFF WBC: CPT | Performed by: PHYSICIAN ASSISTANT

## 2024-10-31 PROCEDURE — 80047 BASIC METABLC PNL IONIZED CA: CPT

## 2024-10-31 RX ORDER — DIPHENHYDRAMINE HCL 25 MG
25 CAPSULE ORAL ONCE
Status: COMPLETED | OUTPATIENT
Start: 2024-10-31 | End: 2024-10-31

## 2024-10-31 RX ORDER — DEXAMETHASONE 4 MG/1
4 TABLET ORAL ONCE
Status: COMPLETED | OUTPATIENT
Start: 2024-10-31 | End: 2024-10-31

## 2024-10-31 RX ORDER — CEPHALEXIN 500 MG/1
500 CAPSULE ORAL 4 TIMES DAILY
Qty: 40 CAPSULE | Refills: 0 | Status: SHIPPED | OUTPATIENT
Start: 2024-10-31 | End: 2024-11-10

## 2024-10-31 RX ORDER — MUPIROCIN 20 MG/G
1 OINTMENT TOPICAL 3 TIMES DAILY
Qty: 1 EACH | Refills: 0 | Status: SHIPPED | OUTPATIENT
Start: 2024-10-31 | End: 2024-11-14

## 2024-10-31 NOTE — ED INITIAL ASSESSMENT (HPI)
Right forearm/hand swelling   started last night , today worse. Denies fever. C/o  area warm to touch, redness. Pt was outside in the backyard  when he felt a sudden pain

## 2024-10-31 NOTE — DISCHARGE INSTRUCTIONS
Please return to the ER/clinic if symptoms worsen. Follow-up with your PCP in 24-48 hours as needed.    Take the antibiotics as  prescribed recommend probiotics to protect the good gut benita.  Use a good antibacterial hand soap and a good hand moisturizer.  Apply mupirocin topically to the dorsum of the hand.  Recommend taking an antihistamine daily i.e. Zyrtec etc.  If symptoms persist or worsen i.e. increased redness swelling streaking fevers etc. go directly to the emergency room.  Otherwise close follow-up with your PCP for further evaluation and treatment.

## 2024-10-31 NOTE — ED PROVIDER NOTES
Patient Seen in: [facility]      History     Chief Complaint   Patient presents with    Swelling    Arm Pain     Stated Complaint: spider bite    Subjective:   HPI      84-year-old male here with complaint of pain, swelling and redness to his right hand across the dorsum extending up his arm.  Patient reports that he was in the backyard when he felt a sudden pinch etc.  Patient believes he may have been stung or bitten by something.  Patient has dealt with cellulitis twice in the past where he was admitted and had to see ID for periods of up to 10 days.  Patient denies any further injury trauma to the area.  Patient denies chest pain, shortness of breath, cough, abdominal pain, nausea, vomiting or diarrhea.  Patient is up-to-date with vaccinations.  Afebrile.    Objective:     Past Medical History:    Back problem    sciatica    Cancer (HCC)    Prostate    Disorder of thyroid    Exposure to medical diagnostic radiation    38 TX'S    GERD (gastroesophageal reflux disease)    Hearing impairment    hearing aids    High blood pressure    High cholesterol    History of cardioversion    Hypothyroidism    Osteoarthritis    Renal disorder    small mass on kidney, followed conservatively at this time.    Sleep apnea    c pap     Thyroid disease    Unspecified essential hypertension    Visual impairment    glasses            The patient's medication list, past medical history and social history elements  as listed in today's nurse's notes are reviewed and agree.   The patient's family history is reviewed and is noncontributory to the presenting problem, except as indicated as above.     Past Surgical History:   Procedure Laterality Date    Colonoscpy, flexible, proximal to splenic flexure; w/directed submucosa injection(s), any substance  2016    Eye surgery Right 09/2020    GLAUCOMA    Laparoscopy, surgical prostatectomy, retropubic radical, w/nerve sparing  02/2002                No pertinent social  history.            Review of Systems    Positive for stated complaint: spider bite  Other systems are as noted in HPI.  Constitutional and vital signs reviewed.      All other systems reviewed and negative except as noted above.    Physical Exam     ED Triage Vitals [10/31/24 1447]   /62   Pulse 78   Resp 16   Temp 97.4 °F (36.3 °C)   Temp src Temporal   SpO2 96 %   O2 Device None (Room air)       Current Vitals:   Vital Signs  BP: 129/66  Pulse: 56  Resp: 16  Temp: 97.4 °F (36.3 °C)  Temp src: Temporal    Oxygen Therapy  SpO2: 98 %  O2 Device: None (Room air)        Physical Exam  Vitals and nursing note reviewed.   Constitutional:       Appearance: Normal appearance. He is well-developed.   HENT:      Head: Normocephalic.      Right Ear: External ear normal.      Left Ear: External ear normal.      Nose: Nose normal.      Mouth/Throat:      Mouth: Mucous membranes are moist.   Eyes:      Conjunctiva/sclera: Conjunctivae normal.      Pupils: Pupils are equal, round, and reactive to light.   Cardiovascular:      Rate and Rhythm: Normal rate and regular rhythm.      Heart sounds: Normal heart sounds.   Pulmonary:      Effort: Pulmonary effort is normal.      Breath sounds: Normal breath sounds.   Musculoskeletal:      Cervical back: Normal range of motion and neck supple.   Skin:     General: Skin is warm.      Capillary Refill: Capillary refill takes less than 2 seconds.      Comments: RUE: pain/swelling/erythema across the dorsum of the R hand extending up the forearm: no fluctuance/streaking noted: FROM, N/V intact, strength 5/5   Neurological:      General: No focal deficit present.      Mental Status: He is alert and oriented to person, place, and time.   Psychiatric:         Mood and Affect: Mood normal.         Behavior: Behavior normal.         Thought Content: Thought content normal.         Judgment: Judgment normal.           ED Course     Labs Reviewed   POCT ISTAT CHEM8 CARTRIDGE - Abnormal;  Notable for the following components:       Result Value    ISTAT BUN 19 (*)     ISTAT Glucose 129 (*)     All other components within normal limits   POCT CBC     NOTE: This is local reaction versus cellulitis.  With patient's history we will be more aggressive and prophylactically treat.              MDM   Clinical Impression: redness and swelling R hand: local reaction from insect sting versus cellulitis  Course of Treatment:   Take the antibiotics as  prescribed recommend probiotics to protect the good gut benita.  Use a good antibacterial hand soap and a good hand moisturizer.  Apply mupirocin topically to the dorsum of the hand.  Recommend taking an antihistamine daily i.e. Zyrtec etc.  If symptoms persist or worsen i.e. increased redness swelling streaking fevers etc. go directly to the emergency room.  Otherwise close follow-up with your PCP for further evaluation and treatment.    The patient is encouraged to return if any concerning symptoms arise. Additional verbal discharge instructions are given and discussed. Discharge medications are discussed. The patient is in good condition throughout the visit today and remains so upon discharge. I discuss the plan of care with the patient, who expresses understanding. All questions and concerns are addressed to the patient's satisfaction prior to discharge today.  Previous conversations with PCP and charts were reviewed.                Disposition and Plan     Clinical Impression:  1. Redness and swelling of hand    2. History of cellulitis         Disposition:  Discharge  10/31/2024  4:28 pm    Follow-up:  Karen Patricia MD  [id number]  [facility] [address]  [phone]                Medications Prescribed:  Current Discharge Medication List        START taking these medications    Details   cephALEXin 500 MG Oral Cap Take 1 capsule (500 mg total) by mouth 4 (four) times daily for 10 days.  Qty: 40 capsule, Refills: 0      mupirocin 2 % External Ointment  Apply 1 Application topically 3 (three) times daily for 14 days.  Qty: 1 each, Refills: 0                 Supplementary Documentation:

## 2025-01-21 ENCOUNTER — TELEPHONE (OUTPATIENT)
Dept: FAMILY MEDICINE CLINIC | Facility: CLINIC | Age: 85
End: 2025-01-21

## 2025-01-21 DIAGNOSIS — E03.9 ACQUIRED HYPOTHYROIDISM: ICD-10-CM

## 2025-01-21 RX ORDER — LEVOTHYROXINE SODIUM 50 UG/1
50 TABLET ORAL
Qty: 90 TABLET | Refills: 0 | Status: SHIPPED | OUTPATIENT
Start: 2025-01-21

## 2025-03-04 ENCOUNTER — TELEPHONE (OUTPATIENT)
Dept: FAMILY MEDICINE CLINIC | Facility: CLINIC | Age: 85
End: 2025-03-04

## 2025-03-04 DIAGNOSIS — K21.9 GASTROESOPHAGEAL REFLUX DISEASE, UNSPECIFIED WHETHER ESOPHAGITIS PRESENT: ICD-10-CM

## 2025-03-04 DIAGNOSIS — R53.83 FATIGUE, UNSPECIFIED TYPE: ICD-10-CM

## 2025-03-04 DIAGNOSIS — E78.5 HYPERLIPIDEMIA, UNSPECIFIED HYPERLIPIDEMIA TYPE: Primary | ICD-10-CM

## 2025-03-04 DIAGNOSIS — Z12.5 ENCOUNTER FOR SCREENING PROSTATE SPECIFIC ANTIGEN (PSA) MEASUREMENT: ICD-10-CM

## 2025-03-04 DIAGNOSIS — E55.9 VITAMIN D DEFICIENCY: ICD-10-CM

## 2025-03-04 NOTE — TELEPHONE ENCOUNTER
Called pt. And scheduled Annual visit on 5/6/25.    Patient is asking for the blood work order prior to his visit.

## 2025-03-05 RX ORDER — OMEPRAZOLE 40 MG/1
40 CAPSULE, DELAYED RELEASE ORAL DAILY
Qty: 90 CAPSULE | Refills: 3 | Status: SHIPPED | OUTPATIENT
Start: 2025-03-05

## 2025-03-31 ENCOUNTER — LAB ENCOUNTER (OUTPATIENT)
Dept: LAB | Age: 85
End: 2025-03-31
Attending: FAMILY MEDICINE
Payer: MEDICARE

## 2025-03-31 DIAGNOSIS — R53.83 FATIGUE, UNSPECIFIED TYPE: ICD-10-CM

## 2025-03-31 DIAGNOSIS — E78.5 HYPERLIPIDEMIA, UNSPECIFIED HYPERLIPIDEMIA TYPE: ICD-10-CM

## 2025-03-31 DIAGNOSIS — E55.9 VITAMIN D DEFICIENCY: ICD-10-CM

## 2025-03-31 DIAGNOSIS — Z12.5 ENCOUNTER FOR SCREENING PROSTATE SPECIFIC ANTIGEN (PSA) MEASUREMENT: ICD-10-CM

## 2025-03-31 LAB
ALBUMIN SERPL-MCNC: 4.7 G/DL (ref 3.2–4.8)
ALBUMIN/GLOB SERPL: 2 {RATIO} (ref 1–2)
ALP LIVER SERPL-CCNC: 61 U/L
ALT SERPL-CCNC: 27 U/L
ANION GAP SERPL CALC-SCNC: 11 MMOL/L (ref 0–18)
AST SERPL-CCNC: 30 U/L (ref ?–34)
BASOPHILS # BLD AUTO: 0.03 X10(3) UL (ref 0–0.2)
BASOPHILS NFR BLD AUTO: 0.4 %
BILIRUB SERPL-MCNC: 1.7 MG/DL (ref 0.2–1.1)
BUN BLD-MCNC: 14 MG/DL (ref 9–23)
CALCIUM BLD-MCNC: 10.1 MG/DL (ref 8.7–10.6)
CHLORIDE SERPL-SCNC: 103 MMOL/L (ref 98–112)
CHOLEST SERPL-MCNC: 134 MG/DL (ref ?–200)
CO2 SERPL-SCNC: 29 MMOL/L (ref 21–32)
COMPLEXED PSA SERPL-MCNC: 0.04 NG/ML (ref ?–4)
CREAT BLD-MCNC: 0.87 MG/DL
EGFRCR SERPLBLD CKD-EPI 2021: 86 ML/MIN/1.73M2 (ref 60–?)
EOSINOPHIL # BLD AUTO: 0.2 X10(3) UL (ref 0–0.7)
EOSINOPHIL NFR BLD AUTO: 2.8 %
ERYTHROCYTE [DISTWIDTH] IN BLOOD BY AUTOMATED COUNT: 13.7 %
FASTING PATIENT LIPID ANSWER: YES
FASTING STATUS PATIENT QL REPORTED: YES
GLOBULIN PLAS-MCNC: 2.3 G/DL (ref 2–3.5)
GLUCOSE BLD-MCNC: 95 MG/DL (ref 70–99)
HCT VFR BLD AUTO: 48.5 %
HDLC SERPL-MCNC: 55 MG/DL (ref 40–59)
HGB BLD-MCNC: 16 G/DL
IMM GRANULOCYTES # BLD AUTO: 0.01 X10(3) UL (ref 0–1)
IMM GRANULOCYTES NFR BLD: 0.1 %
LDLC SERPL CALC-MCNC: 60 MG/DL (ref ?–100)
LYMPHOCYTES # BLD AUTO: 1.71 X10(3) UL (ref 1–4)
LYMPHOCYTES NFR BLD AUTO: 24 %
MCH RBC QN AUTO: 30.4 PG (ref 26–34)
MCHC RBC AUTO-ENTMCNC: 33 G/DL (ref 31–37)
MCV RBC AUTO: 92.2 FL
MONOCYTES # BLD AUTO: 0.6 X10(3) UL (ref 0.1–1)
MONOCYTES NFR BLD AUTO: 8.4 %
NEUTROPHILS # BLD AUTO: 4.58 X10 (3) UL (ref 1.5–7.7)
NEUTROPHILS # BLD AUTO: 4.58 X10(3) UL (ref 1.5–7.7)
NEUTROPHILS NFR BLD AUTO: 64.3 %
NONHDLC SERPL-MCNC: 79 MG/DL (ref ?–130)
OSMOLALITY SERPL CALC.SUM OF ELEC: 296 MOSM/KG (ref 275–295)
PLATELET # BLD AUTO: 225 10(3)UL (ref 150–450)
POTASSIUM SERPL-SCNC: 3.9 MMOL/L (ref 3.5–5.1)
PROT SERPL-MCNC: 7 G/DL (ref 5.7–8.2)
RBC # BLD AUTO: 5.26 X10(6)UL
SODIUM SERPL-SCNC: 143 MMOL/L (ref 136–145)
TRIGL SERPL-MCNC: 105 MG/DL (ref 30–149)
TSI SER-ACNC: 1.58 UIU/ML (ref 0.55–4.78)
VIT D+METAB SERPL-MCNC: 31.8 NG/ML (ref 30–100)
VLDLC SERPL CALC-MCNC: 15 MG/DL (ref 0–30)
WBC # BLD AUTO: 7.1 X10(3) UL (ref 4–11)

## 2025-03-31 PROCEDURE — 84443 ASSAY THYROID STIM HORMONE: CPT

## 2025-03-31 PROCEDURE — 36415 COLL VENOUS BLD VENIPUNCTURE: CPT

## 2025-03-31 PROCEDURE — 80053 COMPREHEN METABOLIC PANEL: CPT

## 2025-03-31 PROCEDURE — 85025 COMPLETE CBC W/AUTO DIFF WBC: CPT

## 2025-03-31 PROCEDURE — 82306 VITAMIN D 25 HYDROXY: CPT

## 2025-03-31 PROCEDURE — 80061 LIPID PANEL: CPT

## 2025-04-28 PROBLEM — C61 MALIGNANT NEOPLASM OF PROSTATE (HCC): Status: RESOLVED | Noted: 2025-04-28 | Resolved: 2025-04-28

## 2025-05-03 ENCOUNTER — APPOINTMENT (OUTPATIENT)
Dept: CT IMAGING | Facility: HOSPITAL | Age: 85
End: 2025-05-03
Attending: EMERGENCY MEDICINE
Payer: MEDICARE

## 2025-05-03 ENCOUNTER — HOSPITAL ENCOUNTER (EMERGENCY)
Facility: HOSPITAL | Age: 85
Discharge: HOME | End: 2025-05-03
Attending: EMERGENCY MEDICINE
Payer: MEDICARE

## 2025-05-03 VITALS
BODY MASS INDEX: 28.79 KG/M2 | TEMPERATURE: 97 F | HEIGHT: 68 IN | HEART RATE: 60 BPM | DIASTOLIC BLOOD PRESSURE: 81 MMHG | OXYGEN SATURATION: 100 % | SYSTOLIC BLOOD PRESSURE: 142 MMHG | WEIGHT: 190 LBS | RESPIRATION RATE: 18 BRPM

## 2025-05-03 DIAGNOSIS — I10 HYPERTENSION, UNSPECIFIED TYPE: Primary | ICD-10-CM

## 2025-05-03 DIAGNOSIS — R42 DIZZINESS: ICD-10-CM

## 2025-05-03 LAB
ALBUMIN SERPL-MCNC: 4.4 G/DL (ref 3.2–4.8)
ALBUMIN/GLOB SERPL: 2.1 {RATIO} (ref 1–2)
ALP LIVER SERPL-CCNC: 63 U/L (ref 45–117)
ALT SERPL-CCNC: 26 U/L (ref 10–49)
ANION GAP SERPL CALC-SCNC: 9 MMOL/L (ref 0–18)
AST SERPL-CCNC: 28 U/L (ref ?–34)
ATRIAL RATE: 62 BPM
BASOPHILS # BLD AUTO: 0.02 X10(3) UL (ref 0–0.2)
BASOPHILS NFR BLD AUTO: 0.3 %
BILIRUB SERPL-MCNC: 1.3 MG/DL (ref 0.2–1.1)
BUN BLD-MCNC: 19 MG/DL (ref 9–23)
CALCIUM BLD-MCNC: 9.7 MG/DL (ref 8.7–10.6)
CHLORIDE SERPL-SCNC: 103 MMOL/L (ref 98–112)
CO2 SERPL-SCNC: 30 MMOL/L (ref 21–32)
CREAT BLD-MCNC: 0.71 MG/DL (ref 0.7–1.3)
EGFRCR SERPLBLD CKD-EPI 2021: 92 ML/MIN/1.73M2 (ref 60–?)
EOSINOPHIL # BLD AUTO: 0.15 X10(3) UL (ref 0–0.7)
EOSINOPHIL NFR BLD AUTO: 2.5 %
ERYTHROCYTE [DISTWIDTH] IN BLOOD BY AUTOMATED COUNT: 13.5 %
GLOBULIN PLAS-MCNC: 2.1 G/DL (ref 2–3.5)
GLUCOSE BLD-MCNC: 108 MG/DL (ref 70–99)
HCT VFR BLD AUTO: 46.7 % (ref 39–53)
HGB BLD-MCNC: 15.5 G/DL (ref 13–17.5)
IMM GRANULOCYTES # BLD AUTO: 0.01 X10(3) UL (ref 0–1)
IMM GRANULOCYTES NFR BLD: 0.2 %
LYMPHOCYTES # BLD AUTO: 1.41 X10(3) UL (ref 1–4)
LYMPHOCYTES NFR BLD AUTO: 23.5 %
MCH RBC QN AUTO: 30.4 PG (ref 26–34)
MCHC RBC AUTO-ENTMCNC: 33.2 G/DL (ref 31–37)
MCV RBC AUTO: 91.6 FL (ref 80–100)
MONOCYTES # BLD AUTO: 0.49 X10(3) UL (ref 0.1–1)
MONOCYTES NFR BLD AUTO: 8.2 %
NEUTROPHILS # BLD AUTO: 3.92 X10 (3) UL (ref 1.5–7.7)
NEUTROPHILS # BLD AUTO: 3.92 X10(3) UL (ref 1.5–7.7)
NEUTROPHILS NFR BLD AUTO: 65.3 %
OSMOLALITY SERPL CALC.SUM OF ELEC: 297 MOSM/KG (ref 275–295)
P AXIS: 85 DEGREES
P-R INTERVAL: 250 MS
PLATELET # BLD AUTO: 217 10(3)UL (ref 150–450)
POTASSIUM SERPL-SCNC: 3.9 MMOL/L (ref 3.5–5.1)
PROT SERPL-MCNC: 6.5 G/DL (ref 5.7–8.2)
Q-T INTERVAL: 400 MS
QRS DURATION: 88 MS
QTC CALCULATION (BEZET): 406 MS
R AXIS: -6 DEGREES
RBC # BLD AUTO: 5.1 X10(6)UL (ref 3.8–5.8)
SODIUM SERPL-SCNC: 142 MMOL/L (ref 136–145)
T AXIS: 26 DEGREES
VENTRICULAR RATE: 62 BPM
WBC # BLD AUTO: 6 X10(3) UL (ref 4–11)

## 2025-05-03 PROCEDURE — 70496 CT ANGIOGRAPHY HEAD: CPT | Performed by: EMERGENCY MEDICINE

## 2025-05-03 PROCEDURE — 99284 EMERGENCY DEPT VISIT MOD MDM: CPT

## 2025-05-03 PROCEDURE — 93010 ELECTROCARDIOGRAM REPORT: CPT

## 2025-05-03 PROCEDURE — 70498 CT ANGIOGRAPHY NECK: CPT | Performed by: EMERGENCY MEDICINE

## 2025-05-03 PROCEDURE — 36415 COLL VENOUS BLD VENIPUNCTURE: CPT

## 2025-05-03 PROCEDURE — 93005 ELECTROCARDIOGRAM TRACING: CPT

## 2025-05-03 PROCEDURE — 80053 COMPREHEN METABOLIC PANEL: CPT | Performed by: EMERGENCY MEDICINE

## 2025-05-03 PROCEDURE — 99285 EMERGENCY DEPT VISIT HI MDM: CPT

## 2025-05-03 PROCEDURE — 85025 COMPLETE CBC W/AUTO DIFF WBC: CPT | Performed by: EMERGENCY MEDICINE

## 2025-05-03 RX ORDER — LOSARTAN POTASSIUM 25 MG/1
12.5 TABLET ORAL DAILY
COMMUNITY

## 2025-05-03 NOTE — ED QUICK NOTES
Rounding Completed    Plan of Care reviewed. Waiting for test results.  Elimination needs assessed.  Vital signs obtained.    Bed is locked and in lowest position. Call light within reach.   No

## 2025-05-03 NOTE — ED QUICK NOTES
PCT given task to ambulate pt with the assist of walker. PT was able to ambulate with a steady gait using the walker. MD aware/notified.

## 2025-05-03 NOTE — DISCHARGE INSTRUCTIONS
Continue to take your medications as prescribed.      - If you have persistent high blood pressure at home, blood pressure greater than 160/90 for more than 2 hours, you may take an extra 12.5 mg of losartan.    - Return to the ER for any new or worsening symptoms.

## 2025-05-03 NOTE — ED INITIAL ASSESSMENT (HPI)
Pt presents to ED from home for dizziness and htb. Pt had elevated bp today 170s and pt called his [facility] doc and was told to come to ed for further eval. Pt has recently just started on losartan x3 weeks ago and since then pt has had dizziness/lethargy/dry mouth. Pt did not take BP medication this am.

## 2025-05-03 NOTE — ED PROVIDER NOTES
Patient Seen in: [facility] Emergency Department      History     Chief Complaint   Patient presents with    Dizziness     Sent over from Primary care referral x2 days of dizziness/generalized weakness and joint pain.     Hypertension     Stated Complaint: htn and dizziness    Subjective:   HPI    Patient here for evaluation of \"dizziness\" and hypertension.      He was recently mated for bradycardia.  His metoprolol was cut from 50-12.5 and 12.5 losartan was added on.  He is been on this medication for a few days.      Today time of early the morning he was getting up and felt unsteady on his feet but did not fall.  Reach with the wall to prevent the fall.  As improved as the morning progressed.      Offered that it might have been due to some chronic issues he is been having with his knees but is not sure.    Wife however is concerned as this is not really ever happened to him before.  Had a hard time getting up out of bed as well.    No chest pain, shortness of breath, no lightheadedness or near syncope, no vertigo.      They discussed this with their cardiologist office and PCP and they are advised to come to the ER.  Summary was worried about stroke.    At the time of this event, the blood pressure was 190s over 100 with a pulse in the 60s.      History of Present Illness               Objective:     Past Medical History:    Back problem    sciatica    Cancer (HCC)    Prostate    Disorder of thyroid    Exposure to medical diagnostic radiation    38 TX'S    GERD (gastroesophageal reflux disease)    Hearing impairment    hearing aids    High blood pressure    High cholesterol    History of cardioversion    Hypothyroidism    Osteoarthritis    Renal disorder    small mass on kidney, followed conservatively at this time.    Sleep apnea    c pap     Thyroid disease    Unspecified essential hypertension    Visual impairment    glasses              Past Surgical History:   Procedure Laterality Date    Colonoscpy,  flexible, proximal to splenic flexure; w/directed submucosa injection(s), any substance  2016    Eye surgery Right 09/2020    GLAUCOMA    Laparoscopy, surgical prostatectomy, retropubic radical, w/nerve sparing  02/2002                Social History     Socioeconomic History    Marital status:    Tobacco Use    Smoking status: Former     Types: Cigars    Smokeless tobacco: Never   Vaping Use    Vaping status: Never Used   Substance and Sexual Activity    Alcohol use: Yes     Comment: couple drinks a month    Drug use: No   Other Topics Concern    Caffeine Concern Yes     Comment: coffee 3 cups daily    Exercise Yes    Seat Belt Yes                                Physical Exam     ED Triage Vitals   BP 05/03/25 1033 (!) 177/94   Pulse 05/03/25 1033 70   Resp 05/03/25 1033 16   Temp 05/03/25 1146 96.5 °F (35.8 °C)   Temp src --    SpO2 05/03/25 1033 100 %   O2 Device 05/03/25 1033 None (Room air)       Current Vitals:   Vital Signs  BP: 142/81  Pulse: 60  Resp: 18  Temp: 96.5 °F (35.8 °C)  MAP (mmHg): 99    Oxygen Therapy  SpO2: 100 %  O2 Device: None (Room air)        Physical Exam        Constitutional:  Appears well-developed and well-nourished. no Distress.  Head: Normocephalic and atraumatic.   Nose: Nose normal.   Eyes: EOM are normal. Pupils are equal, round, and reactive to light.   Neck: Normal range of motion. Neck supple. No JVD present.   Cardiovascular: Normal rate and regular rhythm.    Pulmonary/Chest: Effort normal and breath sounds normal. No stridor.   Abdominal: Soft. There is no tenderness. There is no guarding.   Musculoskeletal: Exhibits no edema or tenderness.     Neurological: Pt is oriented to person, place, and time.    There are no cranial nerve deficits.    There is no nystagmus.  Speech is fluent and not slurred.   There is no word finding difficulty.    No neglect. No gaze  No visual field deficit.    There is no pronator drift.    Strength is 5 out of 5 in the upper extremities  bilaterally.    Sensation is symmetric in the upper extremities and not diminished.    There is no lower extremity drift  Sensation is normal in the lower extremities and not diminished.  It is intact to fine touch.    There is full strength with hip flexion, knee flexion and extension, ankle plantarflexion, and at the EHL.  This is true bilaterally.      Finger to nose intact.  Heel to shin intact.      Skin: Skin is warm and dry.   Psychiatric: Normal mood and affect. Thought content normal.     Physical Exam                ED Course     Labs Reviewed   COMP METABOLIC PANEL (14) - Abnormal; Notable for the following components:       Result Value    Glucose 108 (*)     Calculated Osmolality 297 (*)     Bilirubin, Total 1.3 (*)     A/G Ratio 2.1 (*)     All other components within normal limits   CBC WITH DIFFERENTIAL WITH PLATELET   RAINBOW DRAW LAVENDER   RAINBOW DRAW LIGHT GREEN   RAINBOW DRAW BLUE     EKG62    Rate, intervals and axes as noted on EKG Report.  Rate: 62    Rhythm: Sinus Rhythm  Reading: Sinus rhythm no acute ischemia, first-degree block              Results            CTA BRAIN + CTA CAROTIDS (CPT=70496/55388)  Result Date: 5/3/2025  CONCLUSION:   1. No large vessel occlusion, hemodynamically significant stenosis, dissection, or aneurysm of the major arterial vasculature of the head or neck.  2. No acute intracranial process identified.  3. Enlargement and heterogeneity of the thyroid gland with multiple bilateral nodules, best assessed with thyroid ultrasound.    LOCATION:  [facility]   Dictated by (CST): Nacho Schulte MD on 5/03/2025 at 12:54 PM     Finalized by (CST): Nacho Schulte MD on 5/03/2025 at 1:02 PM           Labs reassuring    Patient ambulated to the ER and did well     Outside records of [facility] reviewed.  PET CT done recently was negative.          MDM          Differential diagnoses considered: Possibility of life-threatening cranial hemorrhage, acute stroke, transient bradycardia  dysrhythmia was all considered.  Hypertensive urgency/hypertensive emergency also consideration    -Given acute stroke is on the differential, will obtain CTA head and neck to look for critical occlusion/stenosis as well as hemorrhage.  Likely dayna with MRI.    Permissive hypertension-until stroke is ruled out or if we find hemorrhage.      Late addendum:  Blood pressure improved significant on its own.  Notably the patient did not take his antihypertensives this morning yet.    Offered /advised MRI to exclude ischemic stroke, patient said he felt totally normal and was not worried about it.    Discussed with MAXIMILIAN Ibarra, agrees with holding off on any medication changes and she will help with exercise outpatient follow-up.    -Long discussion with patient and family about reasons to return to the ER including any neurological symptoms, headaches chest pain shortness of breath.    -They are quite comfortable going home today.        I visualized the radiology studies, my independent interpretation: No large ventricular hemorrhage noted on CT scan of brain    *Discussion of ongoing management of this patient's care included:  MAXIMILIAN PERALES as noted  *Comorbidities contributing to the complexity of decision making: Known hypertension and recent transient third-degree AV block found on Holter  *External charts reviewed: records (as described  *Additional sources of history: n/a    Shared decision making was done by: patient, myself.          Medical Decision Making      Disposition and Plan     Clinical Impression:  1. Hypertension, unspecified type    2. Dizziness         Disposition:  Discharge  5/3/2025  1:43 pm    Follow-up:  Brijesh Sunshine MD  [address] [place] [id number]  [phone]    Follow up            Medications Prescribed:  Discharge Medication List as of 5/3/2025  1:55 PM          Supplementary Documentation:

## 2025-05-05 ENCOUNTER — PATIENT OUTREACH (OUTPATIENT)
Dept: CASE MANAGEMENT | Age: 85
End: 2025-05-05

## 2025-05-06 ENCOUNTER — OFFICE VISIT (OUTPATIENT)
Dept: FAMILY MEDICINE CLINIC | Facility: CLINIC | Age: 85
End: 2025-05-06
Payer: MEDICARE

## 2025-05-06 VITALS
WEIGHT: 192 LBS | HEIGHT: 68 IN | HEART RATE: 58 BPM | OXYGEN SATURATION: 98 % | SYSTOLIC BLOOD PRESSURE: 130 MMHG | BODY MASS INDEX: 29.1 KG/M2 | DIASTOLIC BLOOD PRESSURE: 80 MMHG

## 2025-05-06 DIAGNOSIS — Z00.00 MEDICARE ANNUAL WELLNESS VISIT, SUBSEQUENT: Primary | ICD-10-CM

## 2025-05-06 DIAGNOSIS — R26.89 BALANCE PROBLEM: ICD-10-CM

## 2025-05-06 DIAGNOSIS — I10 HTN (HYPERTENSION), BENIGN: ICD-10-CM

## 2025-05-06 DIAGNOSIS — M17.0 PRIMARY OSTEOARTHRITIS OF BOTH KNEES: ICD-10-CM

## 2025-05-06 DIAGNOSIS — E03.9 ACQUIRED HYPOTHYROIDISM: ICD-10-CM

## 2025-05-06 DIAGNOSIS — H35.3221 EXUDATIVE AGE-RELATED MACULAR DEGENERATION OF LEFT EYE WITH ACTIVE CHOROIDAL NEOVASCULARIZATION (HCC): ICD-10-CM

## 2025-05-06 DIAGNOSIS — Z85.46 HISTORY OF PROSTATE CANCER: ICD-10-CM

## 2025-05-06 DIAGNOSIS — Z90.79 STATUS POST PROSTATECTOMY: ICD-10-CM

## 2025-05-06 PROBLEM — R01.1 HEART MURMUR: Status: RESOLVED | Noted: 2024-04-17 | Resolved: 2025-05-06

## 2025-05-06 PROCEDURE — 99213 OFFICE O/P EST LOW 20 MIN: CPT | Performed by: FAMILY MEDICINE

## 2025-05-06 PROCEDURE — G0439 PPPS, SUBSEQ VISIT: HCPCS | Performed by: FAMILY MEDICINE

## 2025-05-06 PROCEDURE — G2211 COMPLEX E/M VISIT ADD ON: HCPCS | Performed by: FAMILY MEDICINE

## 2025-05-06 RX ORDER — LEVOTHYROXINE SODIUM 50 UG/1
50 TABLET ORAL
Qty: 90 TABLET | Refills: 3 | Status: SHIPPED | OUTPATIENT
Start: 2025-05-06

## 2025-05-06 NOTE — PATIENT INSTRUCTIONS
Lane Kwon, DO  Sport medicine.      [address] [place] [address] [place] [address]    Phone: [phone]

## 2025-05-06 NOTE — PROGRESS NOTES
REASON FOR VISIT:    Ismael Angulo is a 84 year old male who presents for a Medicare Annual Wellness visit, balance problem    HPI:     Patient Care Team: Patient Care Team:  Karen Patricia MD as PCP - General (Family Practice)  Mat Euceda MD (PULMONARY DISEASES)  Brijesh Sunshine MD (CARDIOLOGY)  Prieto Christopher MD (UROLOGY)  Charly Jackson (OPHTHALMOLOGY)  Reginaldo Carr MD (Surgery, Orthopaedic)  Delia Ramos RN as [facility] Manager (Registered Nurse)    Patient Active Problem List   Diagnosis    Acquired hypothyroidism    Status post prostatectomy    History of prostate cancer    Glaucoma    HTN (hypertension), benign    Exudative age-related macular degeneration of left eye with active choroidal neovascularization (HCC)    Balance problem    Medicare annual wellness visit, subsequent   Pt has mild balance problem, last years, did therapy few years ago, really helped, now uses cane and that helps, pt is active at home, going to health club.Bilateral knees OA, pt has Synvisc injections twice a year.    Current Outpatient Medications   Medication Sig Dispense Refill    levothyroxine 50 MCG Oral Tab Take 1 tablet (50 mcg total) by mouth before breakfast. 90 tablet 3    losartan 25 MG Oral Tab Take 0.5 tablets (12.5 mg total) by mouth daily.      OMEPRAZOLE 40 MG Oral Capsule Delayed Release TAKE 1 CAPSULE(40 MG) BY MOUTH DAILY 90 capsule 3    TURMERIC OR Take 1 tablet by mouth daily.      ALPHA LIPOIC ACID OR Take 1 tablet by mouth daily.      ibuprofen 200 MG Oral Tab Take 2 tablets (400 mg total) by mouth every 8 (eight) hours as needed for Pain.      metoprolol succinate ER 25 MG Oral Tablet 24 Hr Take 0.5 tablets (12.5 mg total) by mouth in the morning.      Spironolactone-HCTZ 25-25 MG Oral Tab Take 1 tablet by mouth daily.      Multiple Vitamins-Minerals (CENTRUM SILVER) Oral Tab Take 1 tablet by mouth daily.      Atorvastatin Calcium (LIPITOR) 10 MG Oral Tab Take 1 tablet (10 mg total) by  mouth nightly.      ASPIRIN 81 MG OR TABS Take 1 tablet (81 mg total) by mouth at bedtime.      COQ10 30 MG OR CAPS Take 1 capsule by mouth daily.             Latest Ref Rng & Units 5/3/2025    10:43 AM 3/31/2025    11:17 AM 3/28/2024    11:04 AM 4/26/2023     9:04 AM 1/31/2023    10:20 AM 1/19/2022    10:24 AM 8/11/2021     4:21 PM   Glucose and HbA1c   Glucose 70 - 99 mg/dL 108  95  107  89  111  107  93          Latest Ref Rng & Units 3/31/2025    11:17 AM 3/28/2024    11:04 AM 1/31/2023    10:20 AM 1/19/2022    10:24 AM 1/28/2021     9:19 AM 12/26/2019    10:41 AM 10/18/2018    10:46 AM   Cholesterol   Total Cholesterol <200 mg/dL 134  142  136  141  142  172  155    Triglycerides 30 - 149 mg/dL 105  88  103  118  138  153  110    HDL 40 - 59 mg/dL 55  59  55  49  49  50  51    LDL <100 mg/dL 60  66  62  71  65  91  82          Latest Ref Rng & Units 5/3/2025    10:43 AM 3/31/2025    11:17 AM 3/28/2024    11:04 AM 4/26/2023     9:04 AM 1/31/2023    10:20 AM 1/19/2022    10:24 AM 8/11/2021     4:21 PM   BUN and Cr   BUN 9 - 23 mg/dL 19  14  20  15  18  18  18    Creatinine 0.70 - 1.30 mg/dL 0.71  0.87  0.92  0.74  0.81  0.71  0.81          Latest Ref Rng & Units 5/3/2025    10:43 AM 3/31/2025    11:17 AM 3/28/2024    11:04 AM 4/26/2023     9:04 AM 1/31/2023    10:20 AM 1/19/2022    10:24 AM 8/11/2021     4:21 PM   AST and ALT   AST (SGOT) <34 U/L 28  30  19  18  19  18  21    ALT (SGPT) 10 - 49 U/L 26  27  28  28  22  27  36          Latest Ref Rng & Units 3/31/2025    11:17 AM 3/28/2024    11:04 AM 1/31/2023    10:20 AM 1/19/2022    10:24 AM 1/28/2021     9:19 AM 12/26/2019    10:41 AM 10/18/2018    10:46 AM   TSH and Free T4   TSH 0.550 - 4.780 uIU/mL 1.578  1.770  1.570  1.300  1.950  1.680  1.200    Free T4 0.8 - 1.7 ng/dL      0.9           Latest Ref Rng & Units 3/28/2024    11:04 AM 1/31/2023    10:20 AM 10/18/2018    10:46 AM 12/11/2017    12:23 PM   [facility] REVIEW FLOWSHEET PSA   PSA <=4.00  ng/mL 0.02  0.02  0.02  0.012        General Health      In the past six months, have you lost more than 10 pounds without trying?: 2 - No  Has your appetite been poor?: No  Type of Diet: Balanced  How does the patient maintain a good energy level?: Other  How would you describe your daily physical activity?: Moderate  How would you describe your current health state?: Good  How do you maintain positive mental well-being?: Social Interaction, Visiting Friends, Visiting Family     Functional Ability     Bathing or Showering: Able without help  Toileting: Able without help  Dressing: Able without help  Eating: Able without help  Driving: Need some help  Preparing your meals: Able without help  Managing money/bills: Able without help  Taking medications as prescribed: Able without help  Are you able to afford your medications?: Yes  Hearing Problems?: Yes (hearing aid)     Functional Status     Hearing Problems?: Yes (hearing aid)  Vision Problems? : Yes  Difficulty walking?: Yes  Difficulty dressing or bathing?: No  Problems with daily activities? : Yes  Memory Problems?: No      Fall/Risk Assessment            As above     Depression Screening (PHQ-2/PHQ-9): Over the LAST 2 WEEKS     no depression       Advance Directives     Do you have a healthcare power of ?: Yes  Do you have a living will?: Yes     Cognitive Assessment     What day of the week is this?: Correct  What month is it?: Correct  What year is it?: Correct  Recall \"Ball\": Correct  Recall \"Flag\": Correct  Recall \"Tree\": Correct         PREVENTATIVE SERVICES   INDICATIONS AND SCHEDULE Internal Lab or Procedure   Diabetes Screening     HbgA1C   Annually No results found for: \"A1C\"    Fasting Blood Sugar (FSB)Annually Glucose (mg/dL)   Date Value   05/03/2025 108 (H)   06/22/2006 93     GLUCOSE (mg/dL)   Date Value   09/12/2013 122 (H)      Cardiovascular Disease Screening    LDL Annually LDL Cholesterol Calc (mg/dL)   Date Value   12/21/2006 124 (H)      LDL Cholesterol (mg/dL)   Date Value   03/31/2025 60     LDL CHOLESTROL (mg/dL)   Date Value   02/24/2012 79       EKG - w/ Initial Preventative Physical Exam only, or if medically necessary yes   Colorectal Cancer Screening     Colonoscopy Screen every 10 years No recommendations at this time    Flex Sigmoidoscopy Screen every 5 years No results found for this or any previous visit.    Fecal Occult Blood Annually Occult Blood Result (no units)   Date Value   02/19/2016 Positive for Occult Blood (A)      Glaucoma Screening     Ophthalmology Visit Annually yes   Immunizations     Zoster (Not covered by Medicare Part B) No orders found for this or any previous visit.     SPECIFIC DISEASE MONITORING Internal Lab or Procedure     Annual Monitoring of Persistent Medications  (ACE/ARB, Digoxin, Diuretics)        Potassium  Annually Potassium (mmol/L)   Date Value   05/03/2025 3.9   03/13/2006 3.7     POTASSIUM (mmol/L)   Date Value   09/12/2013 3.1 (L)       Creatinine  Annually CREATININE (mg/dL)   Date Value   09/12/2013 0.81     Creatinine (mg/dL)   Date Value   05/03/2025 0.71     Creatinine, Serum (mg/dL)   Date Value   03/13/2006 0.9       Digoxin Serum Conc  Annually No results found for: \"DIGOXIN\"          ALLERGIES:     Allergies   Allergen Reactions    Simvastatin OTHER (SEE COMMENTS)     Stomach Ache     MEDICAL INFORMATION:   Past Medical History:    Back problem    sciatica    Cancer (HCC)    Prostate    Disorder of thyroid    Exposure to medical diagnostic radiation    38 TX'S    GERD (gastroesophageal reflux disease)    Hearing impairment    hearing aids    High blood pressure    High cholesterol    History of cardioversion    Hypothyroidism    Osteoarthritis    Renal disorder    small mass on kidney, followed conservatively at this time.    Sleep apnea    c pap     Thyroid disease    Unspecified essential hypertension    Visual impairment    glasses      Past Surgical History:   Procedure Laterality  Date    Colonoscpy, flexible, proximal to splenic flexure; w/directed submucosa injection(s), any substance  2016    Eye surgery Right 09/2020    GLAUCOMA    Laparoscopy, surgical prostatectomy, retropubic radical, w/nerve sparing  02/2002      History reviewed. No pertinent family history.  Immunization History      Immunization History  Administered            Date(s) Administered    Covid-19 Vaccine Moderna 100 mcg/0.5 ml                          03/02/2021 03/30/2021 01/02/2022      Covid-19 Vaccine Moderna 50 Mcg/0.25 Ml                          01/05/2022      FLU VAC High Dose 65 YRS & Older PRSV Free (37017)                          09/07/2018  10/09/2020  10/01/2021                            09/09/2022      FLUAD High Dose 65 yr and older (98461)                          10/01/2019  10/09/2020      Fluzone Vaccine Medicare ()                          10/14/2014  10/10/2015  11/14/2016                            10/11/2017  09/06/2018      High Dose Fluzone Influenza Vaccine, 65yr+ PF 0.5mL (66454)                          10/11/2015  09/07/2018  10/01/2021                            10/12/2023      Influenza             10/11/2017      Pneumococcal (Prevnar 13)                          12/11/2017      Pneumovax 23          12/26/2019      TDAP                  02/10/2012        SOCIAL HISTORY:   Social History     Socioeconomic History    Marital status:    Tobacco Use    Smoking status: Former     Types: Cigars    Smokeless tobacco: Never   Vaping Use    Vaping status: Never Used   Substance and Sexual Activity    Alcohol use: Yes     Comment: couple drinks a month    Drug use: No   Other Topics Concern    Caffeine Concern Yes     Comment: coffee 3 cups daily    Exercise Yes    Seat Belt Yes     Social Drivers of Health     Food Insecurity: No Food Insecurity (5/6/2025)    NCSS - Food Insecurity     Worried About Running Out of Food in the Last Year: No     Ran Out of Food in the Last Year:  No   Transportation Needs: No Transportation Needs (5/6/2025)    NCSS - Transportation     Lack of Transportation: No   Housing Stability: Not At Risk (5/6/2025)    NCSS - Housing/Utilities     Has Housing: Yes     Worried About Losing Housing: No     Unable to Get Utilities: No        REVIEW OF SYSTEMS:   GENERAL: feels well otherwise  SKIN: denies any unusual skin lesions  EYES: denies blurred vision or double vision  HEENT: denies nasal congestion, sinus pain or ST  LUNGS: denies shortness of breath with exertion  CARDIOVASCULAR: denies chest pain on exertion  GI: denies abdominal pain, denies heartburn  : denies nocturia or changes in stream  MUSCULOSKELETAL: denies back pain  NEURO: denies headaches  PSYCHE: denies depression or anxiety  HEMATOLOGIC: denies hx of anemia  ENDOCRINE: denies thyroid history  ALL/ASTHMA: denies hx of allergy or asthma    EXAM:   /80   Pulse 58   Ht 5' 8\" (1.727 m)   Wt 192 lb (87.1 kg)   SpO2 98%   BMI 29.19 kg/m²    >   BP Readings from Last 3 Encounters:   05/06/25 130/80   05/03/25 142/81   10/31/24 129/66     GENERAL: well developed, well nourished, in no apparent distress, obese  SKIN: no rashes, no suspicious lesions  HEENT: atraumatic, normocephalic, no masses                Hearing Assessed via: Whispered Voice  EYES: PERRLA, EOMI, conjunctiva are clear normal optic disc  CHEST: no chest tenderness  LUNGS: clear to auscultation  CARDIO: RRR with systolic heart murmur  GI: good BS's, no masses, HSM or tenderness, mild ventral hernia.  RECTAL: deferred  MUSCULOSKELETAL: back is not tender, FROM of the back  EXTREMITIES: no cyanosis, clubbing or edema  NEURO: Oriented times three, cranial nerves are intact, motor and sensory are grossly intact      ASSESSMENT AND PLAN OF RELEVANT CHRONIC CONDITIONS:   Ismael Angulo is a 84 year old male who presents for a Medicare Assessment.     Balance problem: fall precaution d/w the pt.referral for PT when pt is  ready.    Primary osteoarthritis of both knees: referral to ortho.    Status post prostatectomy  History of prostate cancer: PSA normal, doing well.Sees urologist.    Acquired hypothyroidism: excellent control.    HTN (hypertension), benign:  stable  81mg of ASA, low cholesterol and low salt diet and regular exercise encouraged.  All side effects vs benefits d/w the pt.  Medications: Metoprolol    Primary open angle glaucoma (POAG) of both eyes mild stage: sees ophthalmologist,Dr. Ramirez doing well.    Exudative age-related macular degeneration of left eye with active choroidal neovascularization (HCC): sees Dr. Jackson, new problem, injections help.        The patient indicates understanding of these issues and agrees to the plan.  The patient is asked to return in 6 months for office visit  Diet counseling perfomed    SUGGESTED VACCINATIONS - Influenza, Pneumococcal, Zoster, Tetanus   Influenza: No recommendations at this time  Pneumonia: No recommendations at this time  Shingrix shingles vaccine is due

## (undated) DEVICE — SLEEVE KENDALL SCD EXPRESS MED

## (undated) DEVICE — GAUZE STERILE 4X4 12PLY

## (undated) DEVICE — SUT VICRYL 3-0 SH J416H

## (undated) DEVICE — STERILE POLYISOPRENE POWDER-FREE SURGICAL GLOVES: Brand: PROTEXIS

## (undated) DEVICE — MINI LAP PACK-LF: Brand: MEDLINE INDUSTRIES, INC.

## (undated) DEVICE — HEAD AND NECK CDS-LF: Brand: MEDLINE INDUSTRIES, INC.

## (undated) DEVICE — SOL NACL IRRIG 0.9% 1000ML BTL

## (undated) DEVICE — LIGHT HANDLE

## (undated) DEVICE — UNDYED BRAIDED (POLYGLACTIN 910), SYNTHETIC ABSORBABLE SUTURE: Brand: COATED VICRYL

## (undated) DEVICE — GAUZE SPONGES,USP TYPE VII GAUZE, 12 PLY: Brand: CURITY

## (undated) DEVICE — 3M(TM) MICROPORE TAPE DISPENSER 1535-2: Brand: 3M™ MICROPORE™

## (undated) DEVICE — SUT ETHILON 3-0 PS-2 1669H

## (undated) DEVICE — STRL PENROSE DRAIN 18" X 1/4": Brand: CARDINAL HEALTH

## (undated) DEVICE — PTFE COATED BLADE 2.75': Brand: MEDLINE

## (undated) DEVICE — CHLORAPREP 26ML APPLICATOR

## (undated) DEVICE — CURAD PAPER TAPE 2IN

## (undated) DEVICE — SOL  .9 1000ML BTL

## (undated) DEVICE — APPLICATOR CHLORAPREP 26ML

## (undated) DEVICE — SUTURE PROLENE 4-0 PS-2

## (undated) DEVICE — STERILE SYNTHETIC POLYISOPRENE POWDER-FREE SURGICAL GLOVES WITH HYDROGEL COATING: Brand: PROTEXIS

## (undated) DEVICE — KENDALL SCD EXPRESS SLEEVES, KNEE LENGTH, MEDIUM: Brand: KENDALL SCD

## (undated) NOTE — Clinical Note
04/03/2017        Wyyenny Angulo  [facility]      Dear Nidhi Cortez,    [facility] records indicate that you have outstanding lab work and or testing that was ordered for you and has not yet been completed:      CBC W/DIFF  PTT, ACTIVATED

## (undated) NOTE — MR AVS SNAPSHOT
[address] [address] [phone]               Thank you for choosing us for your health care visit with Dee Dee Anthony MD.  We are glad to serve you and happy to provide you with this summary of Current Medications          This list is accurate as of: 2/10/17  2:03 PM.  Always use your most recent med list.                aspirin 81 MG Tabs   daily           Atorvastatin Calcium 10 MG Tabs   Take 10 mg by mouth daily.    Commonly known as:  LIP Friday February 10, 2017     Imaging:  MRI SPINE LUMBAR ([id number])    Instructions:  IMPORTANT    Your physician has ordered a radiology test that may require authorization from your insurance company.   Your physician or the clinic staff will work with y Moderation of alcohol consumption Men: limit to <= 2 drinks* per day. Women and lighter weight persons: limit to <= 1 drink* per day.          Healthy Diet and Regular Exercise  [employer] [address] [place] [address] for making healthy food choices  -   Enj

## (undated) NOTE — LETTER
23    Patient: Mary Jane Kinney  : 1940 Visit date: 3/9/2023    Dear  Ciara Cooley MD    Thank you for referring Mary Jane Kinney to my practice. Please find my assessment and plan below. History of Present Illness     65-year-old gentleman who is here today with his wife for evaluation of large right posterior lateral subcutaneous mass. Darwin's wife reports that this mass has been present for over 5 years however it appears to have been increasing in size. He states that this mass has increased in size over the past few years and is becoming increasingly symptomatic especially when he tries to turn his head    On physical examination there is a 6 cm subcutaneous mass in the right posterior lateral neck which clinically appears to be consistent with a lipoma    Treatment options were reviewed in detail with Snehal Lopez as well as his wife and at this time he does wish to proceed with surgical excision of this lesion. The need for placement of a Penrose drain due to the large size of the these lipoma to prevent a postoperative seroma was discussed. Anne-Marie Kennedy and his wife have no further questions at this time.   They would like to schedule surgery after their return from vacation in late April      Assessment   Lipoma of neck  (primary encounter diagnosis)      Plan     Patient would like to proceed with surgery-excision large posterior lateral right cervical lipoma  With MAC anesthesia  Surgery to be scheduled May 1, 2023        Sincerely,       Preet Lopez MD   CC: No Recipients

## (undated) NOTE — LETTER
21    Patient: Sommer Harrison  : 1940 Visit date: 3/4/2021    Dear  Joel Goode MD    Thank you for referring Sommer Harrison to my practice. Please find my assessment and plan below.      History of Present Illness     74-year-old gentleman w

## (undated) NOTE — LETTER
OUTSIDE TESTING RESULT REQUEST     IMPORTANT: FOR YOUR IMMEDIATE ATTENTION  Please FAX all test results listed below to: [phone]     Testing already done on or about: 3/1/23     * * * * If testing is NOT complete, arrange with patient A.S.A.P. * * * *      Patient Name: Misa Velasquez  Surgery Date: 2023  Medical Record: [id number]  CSN: [id number]  : 1940 - A: [de-identified] y     Sex: male  Surgeon(s):  Joel Sorto MD  Procedure: EXCISION LIPOMA RIGHT POSTERIOR LATERAL NECK  Anesthesia Type: MAC     Surgeon: Joel Sorto MD     The following Testing and Time Line are REQUIRED PER ANESTHESIA     BMP (requires 4 hour fast) within  90 days      Thank Beth Sykes by:Elsa GREEN

## (undated) NOTE — LETTER
Patient Name: Latia Tavarez  YOB: 1940          MRN number:  [id number]  Date:  3/29/2018  Referring Physician:  Vish Puente      Initial Functional Outcome Score 47/100  Final Functional Outcome Score 69/100  Number of Visits Attended 8 tandem stance, 4 Item DGI, and TUG now place patient at dec risk for falls. Though patient does continue with minimal deficits, he has been very compliant to his HEP and independent gym program and feel he will do very well with continued compliance.  Jimbo 5. Patient will be independent and compliant in HEP to maintain progress made in PT. - issued and progressed      Discharge G Code: Mobility: Walking and Moving Around CJ: 20-39% impaired, limited, or restricted  Projected G Code:  Mobility: Walking and Mov